# Patient Record
Sex: FEMALE | Race: WHITE | NOT HISPANIC OR LATINO | Employment: FULL TIME | URBAN - METROPOLITAN AREA
[De-identification: names, ages, dates, MRNs, and addresses within clinical notes are randomized per-mention and may not be internally consistent; named-entity substitution may affect disease eponyms.]

---

## 2018-04-16 ENCOUNTER — TELEPHONE (OUTPATIENT)
Dept: FAMILY MEDICINE | Facility: CLINIC | Age: 43
End: 2018-04-16

## 2018-04-16 RX ORDER — METHOCARBAMOL 500 MG/1
TABLET, FILM COATED ORAL
COMMUNITY
End: 2019-09-16 | Stop reason: SDUPTHER

## 2018-04-16 RX ORDER — PHENTERMINE HYDROCHLORIDE 37.5 MG/1
37.5 TABLET ORAL
Qty: 30 TABLET | Refills: 0 | Status: SHIPPED | OUTPATIENT
Start: 2018-04-16 | End: 2018-07-03 | Stop reason: SDUPTHER

## 2018-04-16 RX ORDER — PHENTERMINE HYDROCHLORIDE 37.5 MG/1
TABLET ORAL
COMMUNITY
End: 2018-04-16 | Stop reason: SDUPTHER

## 2018-04-16 NOTE — TELEPHONE ENCOUNTER
Contacted patient.  I can't prewscribe adipex right now, because I won't be here to f/u with you.  You can check with one of the other providers, but will probably have to make an appointment.

## 2018-05-18 ENCOUNTER — TELEPHONE (OUTPATIENT)
Dept: FAMILY MEDICINE | Facility: CLINIC | Age: 43
End: 2018-05-18

## 2018-05-18 NOTE — TELEPHONE ENCOUNTER
----- Message from RT Delon sent at 5/18/2018 10:11 AM CDT -----  Contact: [phone]  / Specimen No. [id number]  [phone]  / Specimen No. [id number], requesting arrived with illegible labels, please call to confirm if further testing in needed, thanks.

## 2018-05-18 NOTE — TELEPHONE ENCOUNTER
I  sent this message to Kristy Hernandez, as I was under the impression that it pertained to a lab specimen that she collected.  Roxanna

## 2018-06-02 ENCOUNTER — HOSPITAL ENCOUNTER (EMERGENCY)
Facility: HOSPITAL | Age: 43
Discharge: HOME | End: 2018-06-02
Attending: EMERGENCY MEDICINE
Payer: COMMERCIAL

## 2018-06-02 VITALS
HEIGHT: 62 IN | SYSTOLIC BLOOD PRESSURE: 124 MMHG | DIASTOLIC BLOOD PRESSURE: 91 MMHG | WEIGHT: 197 LBS | RESPIRATION RATE: 20 BRPM | OXYGEN SATURATION: 98 % | TEMPERATURE: 98 F | HEART RATE: 90 BPM | BODY MASS INDEX: 36.25 KG/M2

## 2018-06-02 DIAGNOSIS — T14.90XA TRAUMA: ICD-10-CM

## 2018-06-02 DIAGNOSIS — S90.31XA CONTUSION OF RIGHT FOOT, INITIAL ENCOUNTER: Primary | ICD-10-CM

## 2018-06-02 PROCEDURE — 25000003 PHARM REV CODE 250: Performed by: EMERGENCY MEDICINE

## 2018-06-02 PROCEDURE — 99283 EMERGENCY DEPT VISIT LOW MDM: CPT | Mod: 25

## 2018-06-02 PROCEDURE — 25000003 PHARM REV CODE 250

## 2018-06-02 PROCEDURE — 73630 X-RAY EXAM OF FOOT: CPT | Mod: TC,FY,RT

## 2018-06-02 PROCEDURE — 73630 X-RAY EXAM OF FOOT: CPT | Mod: 26,RT,, | Performed by: RADIOLOGY

## 2018-06-02 RX ORDER — HYDROCODONE BITARTRATE AND ACETAMINOPHEN 5; 325 MG/1; MG/1
1-2 TABLET ORAL EVERY 6 HOURS PRN
Qty: 12 TABLET | Refills: 0 | Status: SHIPPED | OUTPATIENT
Start: 2018-06-02 | End: 2018-11-05

## 2018-06-02 RX ORDER — HYDROCODONE BITARTRATE AND ACETAMINOPHEN 10; 325 MG/1; MG/1
1 TABLET ORAL
Status: COMPLETED | OUTPATIENT
Start: 2018-06-02 | End: 2018-06-02

## 2018-06-02 RX ORDER — IBUPROFEN 600 MG/1
600 TABLET ORAL
Status: COMPLETED | OUTPATIENT
Start: 2018-06-02 | End: 2018-06-02

## 2018-06-02 RX ORDER — IBUPROFEN 200 MG
TABLET ORAL
Status: COMPLETED
Start: 2018-06-02 | End: 2018-06-02

## 2018-06-02 RX ADMIN — HYDROCODONE BITARTRATE AND ACETAMINOPHEN 1 TABLET: 10; 325 TABLET ORAL at 10:06

## 2018-06-02 RX ADMIN — IBUPROFEN 600 MG: 200 TABLET, FILM COATED ORAL at 10:06

## 2018-06-02 RX ADMIN — IBUPROFEN 600 MG: 600 TABLET ORAL at 10:06

## 2018-06-03 NOTE — DISCHARGE INSTRUCTIONS
Buddy tape # 2-3  Post op shoe or similar so that the toes do not move  OTC NSAID - eg Motrin  Norco 1-2 every 6 hours   Dr Adkins DPM as needed

## 2018-06-03 NOTE — ED PROVIDER NOTES
Encounter Date: 6/2/2018       History     Chief Complaint   Patient presents with    Foot Pain     right foot pain onset today after dropping large item on foot      42 y F c/o right foot RD2-3 s/p blunt injury with plate glass edge at 0930 this am     Pain and swelling is reported   No prior injury or sugery in the area.           Review of patient's allergies indicates:  No Known Allergies  History reviewed. No pertinent past medical history.  Past Surgical History:   Procedure Laterality Date    HYSTERECTOMY       No family history on file.  Social History   Substance Use Topics    Smoking status: Never Smoker    Smokeless tobacco: Never Used    Alcohol use Yes      Comment: socially     Review of Systems   Musculoskeletal: Positive for arthralgias and joint swelling.   Skin: Negative.    Neurological: Negative for weakness and numbness.   All other systems reviewed and are negative.      Physical Exam     Initial Vitals [06/02/18 2205]   BP Pulse Resp Temp SpO2   (!) 124/91 90 20 98.2 °F (36.8 °C) 98 %      MAP       102         Physical Exam    Nursing note and vitals reviewed.  Constitutional: She appears well-developed and well-nourished.   Cardiovascular: Normal rate, regular rhythm, normal heart sounds and intact distal pulses.   Pulmonary/Chest: Breath sounds normal.   Musculoskeletal: She exhibits edema and tenderness.        Feet:          ED Course   Procedures  Labs Reviewed - No data to display       X-Rays:   Independently Interpreted Readings:   Other Readings:  Right foot    No dislocation or fracture of the involved RD2-3 digits     Medical Decision Making:   ED Management:  Contusion RD2-3 foot  Kevin tape  Post op shoe  Other care and meds as per AVS                       Clinical Impression:   The primary encounter diagnosis was Contusion of right foot, initial encounter. A diagnosis of Trauma was also pertinent to this visit.    X-Ray Foot Complete Right    (Results Pending)                               Jimbo Torres MD  06/03/18 0157

## 2018-07-03 RX ORDER — PHENTERMINE HYDROCHLORIDE 37.5 MG/1
37.5 TABLET ORAL
Qty: 30 TABLET | Refills: 0 | Status: SHIPPED | OUTPATIENT
Start: 2018-07-03 | End: 2018-09-14 | Stop reason: SDUPTHER

## 2018-07-03 RX ORDER — FAMCICLOVIR 500 MG/1
500 TABLET ORAL 3 TIMES DAILY
Qty: 21 TABLET | Refills: 2 | Status: SHIPPED | OUTPATIENT
Start: 2018-07-03 | End: 2018-08-09

## 2018-09-14 RX ORDER — PHENTERMINE HYDROCHLORIDE 37.5 MG/1
37.5 TABLET ORAL
Qty: 30 TABLET | Refills: 0 | Status: SHIPPED | OUTPATIENT
Start: 2018-09-14 | End: 2018-11-13 | Stop reason: SDUPTHER

## 2018-10-03 RX ORDER — FLUCONAZOLE 150 MG/1
150 TABLET ORAL DAILY
Qty: 1 TABLET | Refills: 1 | Status: SHIPPED | OUTPATIENT
Start: 2018-10-03 | End: 2018-10-04

## 2018-11-05 ENCOUNTER — OFFICE VISIT (OUTPATIENT)
Dept: FAMILY MEDICINE | Facility: CLINIC | Age: 43
End: 2018-11-05
Payer: COMMERCIAL

## 2018-11-05 VITALS
BODY MASS INDEX: 33.86 KG/M2 | HEART RATE: 74 BPM | RESPIRATION RATE: 18 BRPM | OXYGEN SATURATION: 100 % | HEIGHT: 62 IN | DIASTOLIC BLOOD PRESSURE: 91 MMHG | SYSTOLIC BLOOD PRESSURE: 139 MMHG | WEIGHT: 184 LBS

## 2018-11-05 DIAGNOSIS — Z12.39 BREAST CANCER SCREENING: ICD-10-CM

## 2018-11-05 DIAGNOSIS — Z00.00 ANNUAL PHYSICAL EXAM: Primary | ICD-10-CM

## 2018-11-05 PROCEDURE — 99999 PR PBB SHADOW E&M-EST. PATIENT-LVL III: CPT | Mod: PBBFAC,,, | Performed by: FAMILY MEDICINE

## 2018-11-05 PROCEDURE — 99396 PREV VISIT EST AGE 40-64: CPT | Mod: S$GLB,,, | Performed by: FAMILY MEDICINE

## 2018-11-05 RX ORDER — FAMCICLOVIR 500 MG/1
TABLET ORAL
COMMUNITY
Start: 2018-07-17 | End: 2018-11-13

## 2018-11-05 NOTE — PROGRESS NOTES
"Subjective:       Patient ID: Kristy Hernandez is a 43 y.o. female.    Chief Complaint: Annual Exam    Wellness visit    Patient reports they are feeling well without complaints today. Pt reports adherence to generally healthy diet, exercise plan, and good sleep schedule. Anticipatory guidance was provided. Wellness labs and procedures for age were discussed.        Review of Systems   Constitutional: Negative for activity change, appetite change, chills, fatigue and fever.   HENT: Negative for congestion, dental problem, facial swelling, nosebleeds, postnasal drip, sinus pain, sore throat, trouble swallowing and voice change.    Eyes: Negative for pain, discharge and visual disturbance.   Respiratory: Negative for apnea, cough, chest tightness and shortness of breath.    Cardiovascular: Negative for chest pain and palpitations.   Gastrointestinal: Negative for abdominal pain, blood in stool, constipation and nausea.   Endocrine: Negative for cold intolerance, polydipsia and polyuria.   Genitourinary: Negative for difficulty urinating, enuresis and flank pain.   Musculoskeletal: Negative for arthralgias and back pain.   Skin: Negative for color change.   Allergic/Immunologic: Negative for environmental allergies and immunocompromised state.   Neurological: Negative for dizziness and light-headedness.   Hematological: Negative for adenopathy.   Psychiatric/Behavioral: Negative for agitation, behavioral problems, decreased concentration and dysphoric mood. The patient is not nervous/anxious.    All other systems reviewed and are negative.        Reviewed family, medical, surgical, and social history.    Objective:      BP (!) 139/91 (BP Location: Left arm, Patient Position: Sitting, BP Method: Medium (Automatic))   Pulse 74   Resp 18   Ht 5' 2" (1.575 m)   Wt 83.5 kg (184 lb)   SpO2 100%   BMI 33.65 kg/m²   Physical Exam   Constitutional: She is oriented to person, place, and time. She appears well-developed " and well-nourished. No distress.   HENT:   Head: Normocephalic and atraumatic.   Nose: Nose normal.   Mouth/Throat: Oropharynx is clear and moist. No oropharyngeal exudate.   Eyes: Conjunctivae and EOM are normal. Pupils are equal, round, and reactive to light. No scleral icterus.   Neck: Normal range of motion. Neck supple. No thyromegaly present.   Cardiovascular: Normal rate, regular rhythm and normal heart sounds. Exam reveals no gallop and no friction rub.   No murmur heard.  Pulmonary/Chest: Effort normal and breath sounds normal. No respiratory distress. She has no wheezes. She has no rales. She exhibits no tenderness.   Abdominal: Soft. Bowel sounds are normal. She exhibits no distension. There is no tenderness. There is no guarding.   Musculoskeletal: Normal range of motion. She exhibits no edema, tenderness or deformity.   Lymphadenopathy:     She has no cervical adenopathy.   Neurological: She is alert and oriented to person, place, and time. She displays normal reflexes. No cranial nerve deficit or sensory deficit. She exhibits normal muscle tone.   Skin: Skin is warm and dry. No rash noted. She is not diaphoretic. No erythema. No pallor.   Psychiatric: She has a normal mood and affect. Her behavior is normal. Judgment and thought content normal.   Nursing note and vitals reviewed.      Assessment:       1. Annual physical exam    2. Breast cancer screening        Plan:       Annual physical exam  -     Glucose, fasting; Future; Expected date: 11/05/2018  -     Lipid panel; Future; Expected date: 11/05/2018    Breast cancer screening  -     Mammo Digital Screening Bilat with CAD; Future; Expected date: 11/05/2018            Risks, benefits, and side effects were discussed with the patient. All questions were answered to the fullest satisfaction of the patient, and pt verbalized understanding and agreement to treatment plan. Pt was to call with any new or worsening symptoms, or present to the ER.

## 2018-11-07 ENCOUNTER — LAB VISIT (OUTPATIENT)
Dept: LAB | Facility: HOSPITAL | Age: 43
End: 2018-11-07
Attending: FAMILY MEDICINE
Payer: COMMERCIAL

## 2018-11-07 DIAGNOSIS — Z00.00 ANNUAL PHYSICAL EXAM: ICD-10-CM

## 2018-11-07 LAB
CHOLEST SERPL-MCNC: 215 MG/DL
CHOLEST/HDLC SERPL: 5.2 {RATIO}
GLUCOSE SERPL-MCNC: 86 MG/DL
HDLC SERPL-MCNC: 41 MG/DL
HDLC SERPL: 19.1 %
LDLC SERPL CALC-MCNC: 132.8 MG/DL
NONHDLC SERPL-MCNC: 174 MG/DL
TRIGL SERPL-MCNC: 206 MG/DL

## 2018-11-07 PROCEDURE — 80061 LIPID PANEL: CPT

## 2018-11-07 PROCEDURE — 36415 COLL VENOUS BLD VENIPUNCTURE: CPT

## 2018-11-07 PROCEDURE — 82947 ASSAY GLUCOSE BLOOD QUANT: CPT

## 2018-11-12 ENCOUNTER — TELEPHONE (OUTPATIENT)
Dept: FAMILY MEDICINE | Facility: CLINIC | Age: 43
End: 2018-11-12
Payer: COMMERCIAL

## 2018-11-12 PROCEDURE — 90471 IMMUNIZATION ADMIN: CPT | Mod: S$GLB,,, | Performed by: FAMILY MEDICINE

## 2018-11-12 PROCEDURE — 90686 IIV4 VACC NO PRSV 0.5 ML IM: CPT | Mod: S$GLB,,, | Performed by: FAMILY MEDICINE

## 2018-11-13 RX ORDER — PHENTERMINE HYDROCHLORIDE 37.5 MG/1
37.5 TABLET ORAL
Qty: 30 TABLET | Refills: 0 | Status: SHIPPED | OUTPATIENT
Start: 2018-11-13 | End: 2019-01-16 | Stop reason: SDUPTHER

## 2018-11-13 RX ORDER — VALACYCLOVIR HYDROCHLORIDE 1 G/1
1000 TABLET, FILM COATED ORAL DAILY
Qty: 90 TABLET | Refills: 3 | Status: SHIPPED | OUTPATIENT
Start: 2018-11-13 | End: 2019-09-11 | Stop reason: SDUPTHER

## 2018-11-13 NOTE — TELEPHONE ENCOUNTER
----- Message from David Lopez MD sent at 11/5/2018  2:17 PM CST -----  Prescribe   Valtrex  phentermine

## 2018-11-30 ENCOUNTER — HOSPITAL ENCOUNTER (OUTPATIENT)
Dept: RADIOLOGY | Facility: HOSPITAL | Age: 43
Discharge: HOME | End: 2018-11-30
Attending: FAMILY MEDICINE
Payer: COMMERCIAL

## 2018-11-30 VITALS — BODY MASS INDEX: 33.13 KG/M2 | HEIGHT: 62 IN | WEIGHT: 180 LBS

## 2018-11-30 DIAGNOSIS — Z12.39 BREAST CANCER SCREENING: ICD-10-CM

## 2018-11-30 PROCEDURE — 77067 SCR MAMMO BI INCL CAD: CPT | Mod: 26,,, | Performed by: RADIOLOGY

## 2018-11-30 PROCEDURE — 77067 SCR MAMMO BI INCL CAD: CPT | Mod: TC

## 2018-12-06 ENCOUNTER — OFFICE VISIT (OUTPATIENT)
Dept: FAMILY MEDICINE | Facility: CLINIC | Age: 43
End: 2018-12-06
Payer: COMMERCIAL

## 2018-12-06 VITALS
OXYGEN SATURATION: 98 % | RESPIRATION RATE: 18 BRPM | BODY MASS INDEX: 33.86 KG/M2 | SYSTOLIC BLOOD PRESSURE: 125 MMHG | DIASTOLIC BLOOD PRESSURE: 83 MMHG | HEIGHT: 62 IN | HEART RATE: 87 BPM | WEIGHT: 184 LBS

## 2018-12-06 DIAGNOSIS — N62 LARGE BREASTS: Primary | ICD-10-CM

## 2018-12-06 PROCEDURE — 3008F BODY MASS INDEX DOCD: CPT | Mod: S$GLB,,, | Performed by: FAMILY MEDICINE

## 2018-12-06 PROCEDURE — 99213 OFFICE O/P EST LOW 20 MIN: CPT | Mod: S$GLB,,, | Performed by: FAMILY MEDICINE

## 2018-12-06 PROCEDURE — 99999 PR PBB SHADOW E&M-EST. PATIENT-LVL III: CPT | Mod: PBBFAC,,, | Performed by: FAMILY MEDICINE

## 2018-12-06 RX ORDER — FAMCICLOVIR 500 MG/1
500 TABLET ORAL 3 TIMES DAILY
COMMUNITY
End: 2019-09-14

## 2018-12-06 NOTE — PROGRESS NOTES
"Subjective:       Patient ID: Kristy Hernandez is a 43 y.o. female.    Chief Complaint: Follow-up    Follow up    Patient complains of large breasts  Poor posture  Lower back pain  Recurrent rashes under breast  Has tried weight loss with no reduction of breast size        Review of Systems   Constitutional: Negative for activity change, appetite change, chills, fatigue and fever.   HENT: Negative for congestion, dental problem, facial swelling, nosebleeds, postnasal drip, sinus pain, sore throat, trouble swallowing and voice change.    Eyes: Negative for pain, discharge and visual disturbance.   Respiratory: Negative for apnea, cough, chest tightness and shortness of breath.    Cardiovascular: Negative for chest pain and palpitations.   Gastrointestinal: Negative for abdominal pain, blood in stool, constipation and nausea.   Endocrine: Negative for cold intolerance, polydipsia and polyuria.   Genitourinary: Negative for difficulty urinating, enuresis and flank pain.   Musculoskeletal: Negative for arthralgias and back pain.   Skin: Negative for color change.   Allergic/Immunologic: Negative for environmental allergies and immunocompromised state.   Neurological: Negative for dizziness and light-headedness.   Hematological: Negative for adenopathy.   Psychiatric/Behavioral: Negative for agitation, behavioral problems, decreased concentration and dysphoric mood. The patient is not nervous/anxious.    All other systems reviewed and are negative.        Reviewed family, medical, surgical, and social history.    Objective:      /83 (BP Location: Left arm, Patient Position: Sitting, BP Method: Medium (Automatic))   Pulse 87   Resp 18   Ht 5' 2" (1.575 m)   Wt 83.5 kg (184 lb)   SpO2 98%   BMI 33.65 kg/m²   Physical Exam   Constitutional: She is oriented to person, place, and time. She appears well-developed and well-nourished. No distress.   HENT:   Head: Normocephalic and atraumatic.   Nose: Nose normal. "   Mouth/Throat: Oropharynx is clear and moist. No oropharyngeal exudate.   Eyes: Conjunctivae and EOM are normal. Pupils are equal, round, and reactive to light. No scleral icterus.   Neck: Normal range of motion. Neck supple. No thyromegaly present.   Cardiovascular: Normal rate, regular rhythm and normal heart sounds. Exam reveals no gallop and no friction rub.   No murmur heard.  Pulmonary/Chest: Effort normal and breath sounds normal. No respiratory distress. She has no wheezes. She has no rales. She exhibits no tenderness.   Abdominal: Soft. Bowel sounds are normal. She exhibits no distension. There is no tenderness. There is no guarding.   Musculoskeletal: Normal range of motion. She exhibits no edema, tenderness or deformity.   Lymphadenopathy:     She has no cervical adenopathy.   Neurological: She is alert and oriented to person, place, and time. She displays normal reflexes. No cranial nerve deficit or sensory deficit. She exhibits normal muscle tone.   Skin: Skin is warm and dry. No rash noted. She is not diaphoretic. No erythema. No pallor.   Psychiatric: She has a normal mood and affect. Her behavior is normal. Judgment and thought content normal.   Nursing note and vitals reviewed.      Assessment:       1. Large breasts        Plan:       Large breasts  -     Ambulatory referral to Plastic Surgery            Risks, benefits, and side effects were discussed with the patient. All questions were answered to the fullest satisfaction of the patient, and pt verbalized understanding and agreement to treatment plan. Pt was to call with any new or worsening symptoms, or present to the ER.

## 2019-01-08 ENCOUNTER — SURGICAL CONSULT (OUTPATIENT)
Dept: PLASTIC SURGERY | Facility: CLINIC | Age: 44
End: 2019-01-08
Payer: COMMERCIAL

## 2019-01-08 VITALS
DIASTOLIC BLOOD PRESSURE: 73 MMHG | SYSTOLIC BLOOD PRESSURE: 135 MMHG | WEIGHT: 188.69 LBS | HEIGHT: 62 IN | BODY MASS INDEX: 34.72 KG/M2 | HEART RATE: 103 BPM

## 2019-01-08 DIAGNOSIS — N62 BREAST HYPERTROPHY IN FEMALE: ICD-10-CM

## 2019-01-08 DIAGNOSIS — N62 BREAST HYPERTROPHY: Primary | ICD-10-CM

## 2019-01-08 PROCEDURE — 99204 PR OFFICE/OUTPT VISIT, NEW, LEVL IV, 45-59 MIN: ICD-10-PCS | Mod: S$GLB,,, | Performed by: PLASTIC SURGERY

## 2019-01-08 PROCEDURE — 3008F PR BODY MASS INDEX (BMI) DOCUMENTED: ICD-10-PCS | Mod: CPTII,S$GLB,, | Performed by: PLASTIC SURGERY

## 2019-01-08 PROCEDURE — 99204 OFFICE O/P NEW MOD 45 MIN: CPT | Mod: S$GLB,,, | Performed by: PLASTIC SURGERY

## 2019-01-08 PROCEDURE — 3008F BODY MASS INDEX DOCD: CPT | Mod: CPTII,S$GLB,, | Performed by: PLASTIC SURGERY

## 2019-01-08 NOTE — PROGRESS NOTES
REFERRAL FOR BREAST REDUCTION    Chief Complaint  Large breasts    Referring provider:  David Lopez MD    [facility]  Kristy Hernandez is a 44 yo female presenting with complaint of back, neck and shoulder pain related to the size of her breasts.  She currently wears a H cup bra and prefers to be a D cup or proportionate to her body habitus.  She has attempted to mitigate symptoms with weight loss, physical therapy, analgesics, and wearing extra bras without loss of weight in the breasts or symptomatic improvement.  She denies personal hx of breast biopsy.  There is a family history of breast cancer on her paternal side including a paternal aunt and cousin with breast cancer in their 40s.  Her most recent mammogram is benign.  Current BMI 33.65. Non-smoker.  Otherwise healthy.  She has had prior pregnancies and desires none further.    Imaging  Mammo 18:  Impression:  No mammographic evidence of malignancy.     BI-RADS Category 1: Negative     Recommendation:  Routine screening mammogram in 1 year is recommended.     The patient's estimated lifetime risk of breast cancer (to age 85) based on Tyrer-Cuzick - 7 risk assessment model is: Tyrer-Cuzick: 16.17 %. According to the [employer] Society,  patients with a lifetime breast cancer risk of 20% or higher might benefit from supplemental screening tests.    [facility]  There is no problem list on file for this patient.    PSH  Past Surgical History:   Procedure Laterality Date    2009    susie R wrist    ENDOMETRIAL ABLATION      HYSTERECTOMY      TONSILLECTOMY      TUBAL LIGATION         OBSTETRIC HISTORY  OB History      Para Term  AB Living    2 2 2          SAB TAB Ectopic Multiple Live Births                       FH  Family History   Problem Relation Age of Onset    Pancreatic cancer Father     Breast cancer Paternal Aunt     Breast cancer Paternal Grandmother     Breast cancer Paternal Cousin        MEDICATIONS  No outpatient  medications have been marked as taking for the 1/8/19 encounter (Appointment) with Ghanshyam Forte MD.       ALLERGIES Review of patient's allergies indicates:  No Known Allergies    SOCIAL HISTORY  Tobacco:   Social History     Tobacco Use   Smoking Status Never Smoker   Smokeless Tobacco Never Used     EtOH:   Social History     Substance and Sexual Activity   Alcohol Use Yes    Comment: socially       ROS  Review of Systems - General ROS: negative for - chills, fatigue, fever, hot flashes, malaise or night sweats  Psychological ROS: negative for - mood swings or sleep disturbances  Hematological and Lymphatic ROS: negative for - bleeding problems, blood clots, blood transfusions, bruising or fatigue  Endocrine ROS: negative for - hair pattern changes, hot flashes, malaise/lethargy, palpitations, polydipsia/polyuria or temperature intolerance  Breast ROS: positive for findings described above, negative for - nipple changes or nipple discharge  Respiratory ROS: no cough, shortness of breath, or wheezing  Cardiovascular ROS: no chest pain or dyspnea on exertion  Gastrointestinal ROS: no abdominal pain, change in bowel habits, or black or bloody stools  Genito-Urinary ROS: no dysuria, trouble voiding, or hematuria  Musculoskeletal ROS: negative for - gait disturbance, joint pain, joint stiffness, joint swelling or muscle pain  Neurological ROS: no TIA or stroke symptoms  Dermatological ROS: negative for acne, dry skin, eczema and hair changes    PHYSICAL EXAM  There were no vitals taken for this visit.    Constitutional: Pt is oriented to person, place, and time.  Pt appears well-developed and well-nourished.   HENT: Normocephalic and atraumatic.   Neck: Normal range of motion. Neck supple. No JVD present.   Cardiovascular: Normal rate, regular rhythm and normal heart sounds.    Pulmonary/Chest: Effort normal. No respiratory distress.   Abdomen: Soft. Non-tender. No masses or distension.  Musculoskeletal: Normal  range of motion. Pt exhibits no edema or deformity.   Neurological: Pt is alert and oriented to person, place, and time. No sensory deficit. Exhibits normal muscle tone.   Skin: Skin is warm. No rash noted. No erythema.   Psychiatric: Pt has a normal mood and affect. Behavior is normal    BREASTS  Asymmetry minimal  ptosis: grade 3  masses: right absent, left absent  estimated excess: right 800-900 gm, left 800-900 gm  shoulder grooving, erythema present   Inframammary fold @ 21 cm from the sternal notch  SN-N: right 35 cm, left 36 cm  IMF-N: right 17 cm, left 17 cm  Nipple sensibility: present  Lateral axillary, breast tail fullness: moderate  Tattoos on/around breast: absent  Lymphadenopathy: none  Scars none    ASSESSMENT  Encounter Diagnoses   Name Primary?    Breast hypertrophy Yes    Breast hypertrophy in female        I reviewed options for surgical management of enlarged breasts which may contribute to this patient's symptoms.  I explained the differences between breast reduction and mastopexy. I believe she will require reduction of at least 800-900 gm per breast and would plan bilateral superomedial pedical wise pattern reduction.  After reviewing outcomes, potential complications, necessary activity and medicinal restrictions, and expected recovery, she would like to proceed with surgery.    INFORMED CONSENT  The procedure was fully discussed with the patient. Outpatient or hospital ambulatory surgery disposition under general anesthesia were explained. She is a candidate for a vertical scar technique; the location of her scars was demonstrated. Her idealized bra size after surgery was solicited for surgical planning, although I explained her bra size after surgery could not be guaranteed.    Procedure: Bilateral breast reduction  Nonsurgical and surgical treatment options were reviewed.  Possible risks of breast reduction include but are not limited to:  -bleeding  -infection  -heavy and prolonged or  permanent scarring, possible keloid formation  -breast lumps, hardness  -breasts different size, shape  -loss of nipple sensation  -hypersensitivity of nipple-areolar complex  -wound separation or delayed wound healing  -venous thromboembolism  -complications from anesthesia  -difficulty with future mammograms  -emotional problems or negative impact on relationships from altered breast size  -desired breast size not attained: breasts too small or too large  -difficult bra fitting  -poor cosmetic outcome  -puckering of incisions  -irregular shape, nipple location  -need for further surgery  -inability to breast feed    Activity, possible work, and medicinal restrictions before and after breast reduction were reviewed. Estimated recovery and return to normal activities was estimated. Frequency of office visits after surgery and possible disability were also reviewed. Her questions about the above were solicited, she was encouraged to call back in the event of any further questions, and she was encouraged to share her plans for breast reduction and any concerns with her significant others.    The importance for a normal mammogram within one year of the surgery date for women over 40 years old, with a family history of breast cancer, or a prior abnormal breast exam was explained. She understands her weight should be optimized prior to surgery.      PLAN  A surgery scheduling form was completed for bilateral reduction mammaplasty.  Time: 3 h  Assistants: staff, tech assist.  Disposition: outpatient or hospital ambulatory surgery  General anesthesia.  Labs: CBC, Electrolytes, pregnancy test, EKG  Antibiotic prophylaxis: Cefazolin 2GM preop  Antiembolic prophylaxis with sequential pneumatic devices.    This encounter length was 45 minutes for  Encounter Diagnoses   Name Primary?    Breast hypertrophy Yes    Breast hypertrophy in female        Over 50% of the encounter length was spent in face to face counseling about the  relevant issues pertaining to the diagnoses, management choices, and prognosis.      Electronically signed by:  Ghanshyam Forte  1/11/2019  11:18 AM

## 2019-01-11 PROBLEM — N62 BREAST HYPERTROPHY IN FEMALE: Status: ACTIVE | Noted: 2019-01-11

## 2019-01-11 RX ORDER — LIDOCAINE HYDROCHLORIDE 10 MG/ML
1 INJECTION, SOLUTION EPIDURAL; INFILTRATION; INTRACAUDAL; PERINEURAL ONCE
Status: CANCELLED | OUTPATIENT
Start: 2019-01-11 | End: 2019-01-11

## 2019-01-16 RX ORDER — PHENTERMINE HYDROCHLORIDE 37.5 MG/1
37.5 TABLET ORAL
Qty: 30 TABLET | Refills: 0 | Status: SHIPPED | OUTPATIENT
Start: 2019-01-16 | End: 2019-09-11 | Stop reason: SDUPTHER

## 2019-03-01 DIAGNOSIS — N62 BREAST HYPERTROPHY IN FEMALE: Primary | ICD-10-CM

## 2019-03-01 DIAGNOSIS — N62 BREAST HYPERTROPHY: Primary | ICD-10-CM

## 2019-03-08 ENCOUNTER — ANESTHESIA EVENT (OUTPATIENT)
Dept: SURGERY | Facility: OTHER | Age: 44
End: 2019-03-08
Payer: COMMERCIAL

## 2019-03-08 ENCOUNTER — HOSPITAL ENCOUNTER (OUTPATIENT)
Dept: PREADMISSION TESTING | Facility: OTHER | Age: 44
Discharge: HOME | End: 2019-03-08
Attending: PLASTIC SURGERY
Payer: COMMERCIAL

## 2019-03-08 ENCOUNTER — OFFICE VISIT (OUTPATIENT)
Dept: PLASTIC SURGERY | Facility: CLINIC | Age: 44
End: 2019-03-08
Payer: COMMERCIAL

## 2019-03-08 VITALS — WEIGHT: 188.69 LBS | HEIGHT: 62 IN | BODY MASS INDEX: 34.72 KG/M2

## 2019-03-08 VITALS
HEART RATE: 104 BPM | TEMPERATURE: 98 F | OXYGEN SATURATION: 96 % | BODY MASS INDEX: 34.6 KG/M2 | DIASTOLIC BLOOD PRESSURE: 90 MMHG | SYSTOLIC BLOOD PRESSURE: 133 MMHG | HEIGHT: 62 IN | WEIGHT: 188 LBS

## 2019-03-08 DIAGNOSIS — N62 BREAST HYPERTROPHY: Primary | ICD-10-CM

## 2019-03-08 DIAGNOSIS — N62 BREAST HYPERTROPHY IN FEMALE: ICD-10-CM

## 2019-03-08 DIAGNOSIS — N62 BREAST HYPERTROPHY: ICD-10-CM

## 2019-03-08 LAB
ANION GAP SERPL CALC-SCNC: 8 MMOL/L
BASOPHILS # BLD AUTO: 0.06 K/UL
BASOPHILS NFR BLD: 0.7 %
BUN SERPL-MCNC: 15 MG/DL
CALCIUM SERPL-MCNC: 9.3 MG/DL
CHLORIDE SERPL-SCNC: 107 MMOL/L
CO2 SERPL-SCNC: 23 MMOL/L
CREAT SERPL-MCNC: 1 MG/DL
DIFFERENTIAL METHOD: ABNORMAL
EOSINOPHIL # BLD AUTO: 0.6 K/UL
EOSINOPHIL NFR BLD: 6.8 %
ERYTHROCYTE [DISTWIDTH] IN BLOOD BY AUTOMATED COUNT: 13.6 %
EST. GFR  (AFRICAN AMERICAN): >60 ML/MIN/1.73 M^2
EST. GFR  (NON AFRICAN AMERICAN): >60 ML/MIN/1.73 M^2
GLUCOSE SERPL-MCNC: 88 MG/DL
HCT VFR BLD AUTO: 38.8 %
HGB BLD-MCNC: 12.9 G/DL
LYMPHOCYTES # BLD AUTO: 2.6 K/UL
LYMPHOCYTES NFR BLD: 28.7 %
MCH RBC QN AUTO: 28.1 PG
MCHC RBC AUTO-ENTMCNC: 33.2 G/DL
MCV RBC AUTO: 85 FL
MONOCYTES # BLD AUTO: 0.5 K/UL
MONOCYTES NFR BLD: 5.9 %
NEUTROPHILS # BLD AUTO: 5.2 K/UL
NEUTROPHILS NFR BLD: 57.8 %
PLATELET # BLD AUTO: 278 K/UL
PMV BLD AUTO: 9.6 FL
POTASSIUM SERPL-SCNC: 4.6 MMOL/L
RBC # BLD AUTO: 4.59 M/UL
SODIUM SERPL-SCNC: 138 MMOL/L
WBC # BLD AUTO: 8.96 K/UL

## 2019-03-08 PROCEDURE — 99499 UNLISTED E&M SERVICE: CPT | Mod: S$GLB,,, | Performed by: PLASTIC SURGERY

## 2019-03-08 PROCEDURE — 93010 EKG 12-LEAD: ICD-10-PCS | Mod: ,,, | Performed by: INTERNAL MEDICINE

## 2019-03-08 PROCEDURE — 99499 NO LOS: ICD-10-PCS | Mod: S$GLB,,, | Performed by: PLASTIC SURGERY

## 2019-03-08 PROCEDURE — 93005 ELECTROCARDIOGRAM TRACING: CPT

## 2019-03-08 PROCEDURE — 80048 BASIC METABOLIC PNL TOTAL CA: CPT

## 2019-03-08 PROCEDURE — 36415 COLL VENOUS BLD VENIPUNCTURE: CPT

## 2019-03-08 PROCEDURE — 85025 COMPLETE CBC W/AUTO DIFF WBC: CPT

## 2019-03-08 PROCEDURE — 93010 ELECTROCARDIOGRAM REPORT: CPT | Mod: ,,, | Performed by: INTERNAL MEDICINE

## 2019-03-08 RX ORDER — SODIUM CHLORIDE, SODIUM LACTATE, POTASSIUM CHLORIDE, CALCIUM CHLORIDE 600; 310; 30; 20 MG/100ML; MG/100ML; MG/100ML; MG/100ML
INJECTION, SOLUTION INTRAVENOUS CONTINUOUS
Status: CANCELLED | OUTPATIENT
Start: 2019-03-08

## 2019-03-08 RX ORDER — FAMOTIDINE 20 MG/1
20 TABLET, FILM COATED ORAL
Status: CANCELLED | OUTPATIENT
Start: 2019-03-08 | End: 2019-03-08

## 2019-03-08 RX ORDER — LIDOCAINE HYDROCHLORIDE 10 MG/ML
0.5 INJECTION, SOLUTION EPIDURAL; INFILTRATION; INTRACAUDAL; PERINEURAL ONCE
Status: CANCELLED | OUTPATIENT
Start: 2019-03-08 | End: 2019-03-08

## 2019-03-08 RX ORDER — OXYCODONE HYDROCHLORIDE 5 MG/1
5 TABLET ORAL ONCE AS NEEDED
Status: CANCELLED | OUTPATIENT
Start: 2019-03-08 | End: 2030-08-04

## 2019-03-08 NOTE — H&P (VIEW-ONLY)
PREOPERATIVE HISTORY AND PHYSICAL    Chief Complaint:  Breast Hypertrophy    PCP: David Lopez MD    HPI  History from chart, patient  From my original consult :  Kristy Hernandez is a 44 yo female presenting with complaint of back, neck and shoulder pain related to the size of her breasts.  She currently wears a H cup bra and prefers to be a D cup or proportionate to her body habitus.  She has attempted to mitigate symptoms with weight loss, physical therapy, analgesics, and wearing extra bras without loss of weight in the breasts or symptomatic improvement.  She denies personal hx of breast biopsy.  There is a family history of breast cancer on her paternal side including a paternal aunt and cousin with breast cancer in their 40s.  Her most recent mammogram is benign.  Current BMI 33.65. Non-smoker.  Otherwise healthy.  She has had prior pregnancies and desires none further.    Today:  She returns to signs consents, review risks, benefits, potential complications and expected outcomes.  All questions answered.  Photos and labs to be completed today.    [facility]  Patient Active Problem List    Diagnosis Date Noted    Breast hypertrophy in female 2019     There are no hospital problems to display for this patient.      [facility]  Past Surgical History:   Procedure Laterality Date    2009    dequervein R wrist    ENDOMETRIAL ABLATION      HYSTERECTOMY      TONSILLECTOMY      TUBAL LIGATION         FHx  Family History   Problem Relation Age of Onset    Pancreatic cancer Father     Breast cancer Paternal Aunt     Breast cancer Paternal Grandmother     Breast cancer Paternal Cousin        OB-Hx  OB History      Para Term  AB Living    2 2 2          SAB TAB Ectopic Multiple Live Births                       MEDICATIONS  Current Outpatient Medications   Medication Sig Dispense Refill    famciclovir (FAMVIR) 500 MG tablet Take 500 mg by mouth 3 (three) times daily.      methocarbamol  "(ROBAXIN) 500 MG Tab Robaxin 500 mg tablet   Take 1 tablet as needed by oral route.      phentermine (ADIPEX-P) 37.5 mg tablet Take 1 tablet (37.5 mg total) by mouth before breakfast. 30 tablet 0    valACYclovir (VALTREX) 1000 MG tablet Take 1 tablet (1,000 mg total) by mouth once daily. 90 tablet 3    vortioxetine (TRINTELLIX) 20 mg Tab Take 1 tablet (20 mg total) by mouth once daily. 90 tablet 3     No current facility-administered medications for this visit.        ALLERGIES Review of patient's allergies indicates:  No Known Allergies    SOCIAL HISTORY  Tobacco:   Social History     Tobacco Use   Smoking Status Never Smoker   Smokeless Tobacco Never Used     EtOH:   Social History     Substance and Sexual Activity   Alcohol Use Yes    Comment: socially     Drugs:   Social History     Substance and Sexual Activity   Drug Use Not on file       ALLERGIES  Review of patient's allergies indicates:  No Known Allergies    REVIEW OF SYSTEMS:  CONSTITUTIONAL: negative for fatigue, chills, fever, weight gain, weight loss  INTEGUMENTARY: negative for rash, pruritis, skin lesions  HENT: negative for congestion, hoarseness, hearing loss, and sore throat  PULMONARY: negative cough, productive sputum, wheezing, shortness of breath  CARDIAC: negative chest pain, palpitations, dyspnea on exertion, orthopnea  GENITOURINARY: negative for dysuria, bloody urine, discharge  ENDOCRINE: negative excessive thirst, frequent urination, unexplained weight loss  HEMATOPOIEOTIC:negative bruising, enlarged lymph nodes, prolonged bleeding  NEUROLOGIC: negative dizziness, loss of consciousness, numbness  PSYCHIATRIC: negative for depression, hearing voices, anxiety    PHYSICAL EXAMINATION  GENERAL APPEARANCE  Ht 5' 2" (1.575 m)   Wt 85.6 kg (188 lb 11.4 oz)   BMI 34.52 kg/m²   Well developed, well nourished in no acute distress.    INTEGUMENT  Skin is warm and dry. No erythema, rash.    PSYCHIATRIC  Affect normal.    HEENT  Normocephalic, " atraumatic.  Extraocular motions normal. PERRL  Neck: Neck supple.    BREAST  BREASTS  Asymmetry minimal  ptosis: grade 3  masses: right absent, left absent  estimated excess: right 800-900 gm, left 800-900 gm  shoulder grooving, erythema present   Inframammary fold @ 21 cm from the sternal notch  SN-N: right 35 cm, left 36 cm  IMF-N: right 17 cm, left 17 cm  Nipple sensibility: present  Lateral axillary, breast tail fullness: moderate  Tattoos on/around breast: absent  Lymphadenopathy: none  Scars none    LUNGS  Clear, no wheezing, crackles, rhonchi    CARDIOVASCULAR  No JVD    ABDOMEN  Soft, nontender  Organomegaly absent  Hernias absent    EXTREMITIES  Peripheral edema absent  Pedal, wrist pulses present    LABS  No results found for: WBC, HGB, HCT, MCV, PLT  No results found for: NA, K, CL, CO2  No results found for: BUN  No results found for: CREATININE  No results found for: ALBUMIN  No results found for: LABA1C, HGBA1C    IMAGING  Reviewed Mammogram 11/2018    ASSESSMENT  Encounter Diagnoses   Name Primary?    Breast hypertrophy Yes     INFORMED CONSENT    The procedure was fully discussed with the patient. Outpatient or hospital ambulatory surgery disposition under general anesthesia were explained.  The location of her scars was demonstrated. Her idealized bra size after surgery was solicited for surgical planning, although I explained her bra size after surgery could not be guaranteed.    Nonsurgical and surgical treatment options were reviewed.  Possible risks of breast reduction include but are not limited to:  -bleeding  -infection  -heavy and prolonged or permanent scarring, possible keloid formation  -nipple necrosis, complete or partial  -nipple inversion or flattening  -breast lumps, hardness  -breasts different size, shape  -loss of nipple sensation  -hypersensitivity of nipple-areolar complex  -wound separation or delayed wound healing  -venous thromboembolism  -complications from  anesthesia  -difficulty with future mammograms  -emotional problems or negative impact on relationships from altered breast size  -desired breast size not attained: breasts too small or too large  -difficult bra fitting  -poor cosmetic outcome  -puckering of incisions  -irregular shape, nipple location  -need for further surgery  -inability to breast feed  -problems with anesthesia  -blood clot, pulmonary embolus  -heart attack, stroke, death    Activity, possible work, and medicinal restrictions before and after breast reduction were reviewed. Estimated recovery and return to normal activities was estimated. Frequency of office visits after surgery and possible disability were also reviewed. Her questions about the above were solicited, she was encouraged to call back in the event of any further questions, and she was encouraged to share her plans for breast reduction and any concerns with her significant others.    The importance for a normal mammogram within one year of the surgery date for women over 40 years old, with a family history of breast cancer, or a prior abnormal breast exam was explained. She understands her weight should be optimized prior to surgery.    PLAN  A surgery scheduling form was completed for bilateral reduction mammaplasty.  Time: 2 1/2 HRS  Assistants: staff, tech assist.  Disposition: outpatient or hospital ambulatory surgery  General anesthesia.  Labs: CBC, HbA1C, EKG, pregnancy test, electrolytes.  Antibiotic prophylaxis: Cefazolin 2 GM preop  Antiembolic prophylaxis with sequential pneumatic devices.    Electronically signed by:  Ghanshyam Forte  3/8/2019  12:20 PM

## 2019-03-08 NOTE — ANESTHESIA PREPROCEDURE EVALUATION
03/08/2019  Kristy Hernandez is a 43 y.o., female.    Anesthesia Evaluation    I have reviewed the Patient Summary Reports.    I have reviewed the Nursing Notes.   I have reviewed the Medications.     Review of Systems  Anesthesia Hx:  No problems with previous Anesthesia    Social:  Former Smoker, Social Alcohol Use    Hematology/Oncology:  Hematology Normal   Oncology Normal     EENT/Dental:EENT/Dental Normal   Cardiovascular:  Cardiovascular Normal Exercise tolerance: good     Pulmonary:  Pulmonary Normal    Renal/:  Renal/ Normal     Hepatic/GI:  Hepatic/GI Normal    Musculoskeletal:  Musculoskeletal Normal    Neurological:  Neurology Normal    Endocrine:  Endocrine Normal    Psych:   anxiety depression          Physical Exam  General:  Obesity    Airway/Jaw/Neck:  Airway Findings: Mouth Opening: Normal Tongue: Normal  General Airway Assessment: Adult, Good  Mallampati: I  TM Distance: Normal, at least 6 cm  Jaw/Neck Findings:  Neck ROM: Normal ROM      Dental:  Dental Findings: In tact, molar caps        Mental Status:  Mental Status Findings:  Cooperative, Alert and Oriented         Anesthesia Plan  Type of Anesthesia, risks & benefits discussed:  Anesthesia Type:  general  Patient's Preference:   Intra-op Monitoring Plan: standard ASA monitors  Intra-op Monitoring Plan Comments:   Post Op Pain Control Plan:   Post Op Pain Control Plan Comments:   Induction:    Beta Blocker:         Informed Consent: Patient understands risks and agrees with Anesthesia plan.  Questions answered. Anesthesia consent signed with patient.  ASA Score: 2     Day of Surgery Review of History & Physical:    H&P update referred to the surgeon.     Anesthesia Plan Notes: Labs pending.        Ready For Surgery From Anesthesia Perspective.

## 2019-03-08 NOTE — DISCHARGE INSTRUCTIONS
PRE-ADMIT TESTING -  [phone]    [address]  [place] [facility]          Your surgery has been scheduled at [facility]. We are pleased to have the opportunity to serve you. For Further Information please call [phone].    On the day of surgery please report to the Information Desk on the 1st floor.    · CONTACT YOUR PHYSICIAN'S OFFICE THE DAY PRIOR TO YOUR SURGERY TO OBTAIN YOUR ARRIVAL TIME.     · The evening before surgery do not eat anything after 9 p.m. ( this includes hard candy, chewing gum and mints).  You may only have GATORADE, POWERADE AND WATER  from 9 p.m. until you leave your home.   DO NOT DRINK ANY LIQUIDS ON THE WAY TO THE HOSPITAL.      SPECIAL MEDICATION INSTRUCTIONS: TAKE medications checked off by the Anesthesiologist on your Medication List.    Angiogram Patients: Take medications as instructed by your physician, including aspirin.     Surgery Patients:    If you take ASPIRIN - Your PHYSICIAN/SURGEON will need to inform you IF/OR when you need to stop taking aspirin prior to your surgery.     Do Not take any medications containing IBUPROFEN.  Do Not Wear any make-up or dark nail polish   (especially eye make-up) to surgery. If you come to surgery with makeup on you will be required to remove the makeup or nail polish.    Do not shave your surgical area at least 5 days prior to your surgery. The surgical prep will be performed at the hospital according to Infection Control regulations.    Leave all valuables at home.   Do Not wear any jewelry or watches, including any metal in body piercings. Jewelry must be removed prior to coming to the hospital.  There is a possibility that rings that are unable to be removed may be cut off if they are on the surgical extremity.    Contact Lens must be removed before surgery. Either do not wear the contact lens or bring a case and solution for storage.  Please bring a container for eyeglasses or dentures as required.  Bring  any paperwork your physician has provided, such as consent forms,  history and physicals, doctor's orders, etc.   Bring comfortable clothes that are loose fitting to wear upon discharge. Take into consideration the type of surgery being performed.  Maintain your diet as advised per your physician the day prior to surgery.      Adequate rest the night before surgery is advised.   Park in the Parking lot behind the hospital or in the [facility] across the street from the parking lot. Parking is complimentary.  If you will be discharged the same day as your procedure, please arrange for a responsible adult to drive you home or to accompany you if traveling by taxi.   YOU WILL NOT BE PERMITTED TO DRIVE OR TO LEAVE THE HOSPITAL ALONE AFTER SURGERY.   It is strongly recommended that you arrange for someone to remain with you for the first 24 hrs following your surgery.       Thank you for your cooperation.  The Staff of [facility].        Bathing Instructions                                                                 Please shower the evening before and morning of your procedure with    ANTIBACTERIAL SOAP. ( DIAL, etc )  Concentrate on the surgical area   for at least 3 minutes and rinse completely. Dry off as usual.   Do not use any deodorant, powder, body lotions, perfume, after shave or    cologne.

## 2019-03-08 NOTE — H&P
PREOPERATIVE HISTORY AND PHYSICAL    Chief Complaint:  Breast Hypertrophy    PCP: David Lopez MD    HPI  History from chart, patient  From my original consult :  Kristy Hernandez is a 44 yo female presenting with complaint of back, neck and shoulder pain related to the size of her breasts.  She currently wears a H cup bra and prefers to be a D cup or proportionate to her body habitus.  She has attempted to mitigate symptoms with weight loss, physical therapy, analgesics, and wearing extra bras without loss of weight in the breasts or symptomatic improvement.  She denies personal hx of breast biopsy.  There is a family history of breast cancer on her paternal side including a paternal aunt and cousin with breast cancer in their 40s.  Her most recent mammogram is benign.  Current BMI 33.65. Non-smoker.  Otherwise healthy.  She has had prior pregnancies and desires none further.    Today:  She returns to signs consents, review risks, benefits, potential complications and expected outcomes.  All questions answered.  Photos and labs to be completed today.    [facility]  Patient Active Problem List    Diagnosis Date Noted    Breast hypertrophy in female 2019     There are no hospital problems to display for this patient.      [facility]  Past Surgical History:   Procedure Laterality Date    2009    dequervein R wrist    ENDOMETRIAL ABLATION      HYSTERECTOMY      TONSILLECTOMY      TUBAL LIGATION         FHx  Family History   Problem Relation Age of Onset    Pancreatic cancer Father     Breast cancer Paternal Aunt     Breast cancer Paternal Grandmother     Breast cancer Paternal Cousin        OB-Hx  OB History      Para Term  AB Living    2 2 2          SAB TAB Ectopic Multiple Live Births                       MEDICATIONS  Current Outpatient Medications   Medication Sig Dispense Refill    famciclovir (FAMVIR) 500 MG tablet Take 500 mg by mouth 3 (three) times daily.      methocarbamol  "(ROBAXIN) 500 MG Tab Robaxin 500 mg tablet   Take 1 tablet as needed by oral route.      phentermine (ADIPEX-P) 37.5 mg tablet Take 1 tablet (37.5 mg total) by mouth before breakfast. 30 tablet 0    valACYclovir (VALTREX) 1000 MG tablet Take 1 tablet (1,000 mg total) by mouth once daily. 90 tablet 3    vortioxetine (TRINTELLIX) 20 mg Tab Take 1 tablet (20 mg total) by mouth once daily. 90 tablet 3     No current facility-administered medications for this visit.        ALLERGIES Review of patient's allergies indicates:  No Known Allergies    SOCIAL HISTORY  Tobacco:   Social History     Tobacco Use   Smoking Status Never Smoker   Smokeless Tobacco Never Used     EtOH:   Social History     Substance and Sexual Activity   Alcohol Use Yes    Comment: socially     Drugs:   Social History     Substance and Sexual Activity   Drug Use Not on file       ALLERGIES  Review of patient's allergies indicates:  No Known Allergies    REVIEW OF SYSTEMS:  CONSTITUTIONAL: negative for fatigue, chills, fever, weight gain, weight loss  INTEGUMENTARY: negative for rash, pruritis, skin lesions  HENT: negative for congestion, hoarseness, hearing loss, and sore throat  PULMONARY: negative cough, productive sputum, wheezing, shortness of breath  CARDIAC: negative chest pain, palpitations, dyspnea on exertion, orthopnea  GENITOURINARY: negative for dysuria, bloody urine, discharge  ENDOCRINE: negative excessive thirst, frequent urination, unexplained weight loss  HEMATOPOIEOTIC:negative bruising, enlarged lymph nodes, prolonged bleeding  NEUROLOGIC: negative dizziness, loss of consciousness, numbness  PSYCHIATRIC: negative for depression, hearing voices, anxiety    PHYSICAL EXAMINATION  GENERAL APPEARANCE  Ht 5' 2" (1.575 m)   Wt 85.6 kg (188 lb 11.4 oz)   BMI 34.52 kg/m²   Well developed, well nourished in no acute distress.    INTEGUMENT  Skin is warm and dry. No erythema, rash.    PSYCHIATRIC  Affect normal.    HEENT  Normocephalic, " atraumatic.  Extraocular motions normal. PERRL  Neck: Neck supple.    BREAST  BREASTS  Asymmetry minimal  ptosis: grade 3  masses: right absent, left absent  estimated excess: right 800-900 gm, left 800-900 gm  shoulder grooving, erythema present   Inframammary fold @ 21 cm from the sternal notch  SN-N: right 35 cm, left 36 cm  IMF-N: right 17 cm, left 17 cm  Nipple sensibility: present  Lateral axillary, breast tail fullness: moderate  Tattoos on/around breast: absent  Lymphadenopathy: none  Scars none    LUNGS  Clear, no wheezing, crackles, rhonchi    CARDIOVASCULAR  No JVD    ABDOMEN  Soft, nontender  Organomegaly absent  Hernias absent    EXTREMITIES  Peripheral edema absent  Pedal, wrist pulses present    LABS  No results found for: WBC, HGB, HCT, MCV, PLT  No results found for: NA, K, CL, CO2  No results found for: BUN  No results found for: CREATININE  No results found for: ALBUMIN  No results found for: LABA1C, HGBA1C    IMAGING  Reviewed Mammogram 11/2018    ASSESSMENT  Encounter Diagnoses   Name Primary?    Breast hypertrophy Yes     INFORMED CONSENT    The procedure was fully discussed with the patient. Outpatient or hospital ambulatory surgery disposition under general anesthesia were explained.  The location of her scars was demonstrated. Her idealized bra size after surgery was solicited for surgical planning, although I explained her bra size after surgery could not be guaranteed.    Nonsurgical and surgical treatment options were reviewed.  Possible risks of breast reduction include but are not limited to:  -bleeding  -infection  -heavy and prolonged or permanent scarring, possible keloid formation  -nipple necrosis, complete or partial  -nipple inversion or flattening  -breast lumps, hardness  -breasts different size, shape  -loss of nipple sensation  -hypersensitivity of nipple-areolar complex  -wound separation or delayed wound healing  -venous thromboembolism  -complications from  anesthesia  -difficulty with future mammograms  -emotional problems or negative impact on relationships from altered breast size  -desired breast size not attained: breasts too small or too large  -difficult bra fitting  -poor cosmetic outcome  -puckering of incisions  -irregular shape, nipple location  -need for further surgery  -inability to breast feed  -problems with anesthesia  -blood clot, pulmonary embolus  -heart attack, stroke, death    Activity, possible work, and medicinal restrictions before and after breast reduction were reviewed. Estimated recovery and return to normal activities was estimated. Frequency of office visits after surgery and possible disability were also reviewed. Her questions about the above were solicited, she was encouraged to call back in the event of any further questions, and she was encouraged to share her plans for breast reduction and any concerns with her significant others.    The importance for a normal mammogram within one year of the surgery date for women over 40 years old, with a family history of breast cancer, or a prior abnormal breast exam was explained. She understands her weight should be optimized prior to surgery.    PLAN  A surgery scheduling form was completed for bilateral reduction mammaplasty.  Time: 2 1/2 HRS  Assistants: staff, tech assist.  Disposition: outpatient or hospital ambulatory surgery  General anesthesia.  Labs: CBC, HbA1C, EKG, pregnancy test, electrolytes.  Antibiotic prophylaxis: Cefazolin 2 GM preop  Antiembolic prophylaxis with sequential pneumatic devices.    Electronically signed by:  Ghanshyam Forte  3/8/2019  12:20 PM

## 2019-03-13 ENCOUNTER — ANESTHESIA (OUTPATIENT)
Dept: SURGERY | Facility: OTHER | Age: 44
End: 2019-03-13
Payer: COMMERCIAL

## 2019-03-13 ENCOUNTER — HOSPITAL ENCOUNTER (OUTPATIENT)
Facility: OTHER | Age: 44
Discharge: HOME | End: 2019-03-13
Attending: PLASTIC SURGERY | Admitting: PLASTIC SURGERY
Payer: COMMERCIAL

## 2019-03-13 VITALS
TEMPERATURE: 98 F | SYSTOLIC BLOOD PRESSURE: 122 MMHG | HEIGHT: 62 IN | HEART RATE: 98 BPM | WEIGHT: 188 LBS | RESPIRATION RATE: 16 BRPM | OXYGEN SATURATION: 95 % | DIASTOLIC BLOOD PRESSURE: 71 MMHG | BODY MASS INDEX: 34.6 KG/M2

## 2019-03-13 DIAGNOSIS — N62 BREAST HYPERTROPHY IN FEMALE: Primary | ICD-10-CM

## 2019-03-13 PROCEDURE — 25000003 PHARM REV CODE 250: Performed by: NURSE ANESTHETIST, CERTIFIED REGISTERED

## 2019-03-13 PROCEDURE — 19318 PR REDUCTION OF LARGE BREAST: ICD-10-PCS | Mod: 50,,, | Performed by: PLASTIC SURGERY

## 2019-03-13 PROCEDURE — P9045 ALBUMIN (HUMAN), 5%, 250 ML: HCPCS | Mod: JG | Performed by: NURSE ANESTHETIST, CERTIFIED REGISTERED

## 2019-03-13 PROCEDURE — 25000003 PHARM REV CODE 250: Performed by: ANESTHESIOLOGY

## 2019-03-13 PROCEDURE — 88305 TISSUE SPECIMEN TO PATHOLOGY - SURGERY: ICD-10-PCS | Mod: 26,,, | Performed by: PATHOLOGY

## 2019-03-13 PROCEDURE — 37000008 HC ANESTHESIA 1ST 15 MINUTES: Performed by: PLASTIC SURGERY

## 2019-03-13 PROCEDURE — 63600175 PHARM REV CODE 636 W HCPCS: Performed by: ANESTHESIOLOGY

## 2019-03-13 PROCEDURE — 25000003 PHARM REV CODE 250: Performed by: SPECIALIST

## 2019-03-13 PROCEDURE — 71000016 HC POSTOP RECOV ADDL HR: Performed by: PLASTIC SURGERY

## 2019-03-13 PROCEDURE — 71000015 HC POSTOP RECOV 1ST HR: Performed by: PLASTIC SURGERY

## 2019-03-13 PROCEDURE — 36000706: Performed by: PLASTIC SURGERY

## 2019-03-13 PROCEDURE — 36000707: Performed by: PLASTIC SURGERY

## 2019-03-13 PROCEDURE — 63600175 PHARM REV CODE 636 W HCPCS: Performed by: NURSE ANESTHETIST, CERTIFIED REGISTERED

## 2019-03-13 PROCEDURE — 19318 BREAST REDUCTION: CPT | Mod: 50,,, | Performed by: PLASTIC SURGERY

## 2019-03-13 PROCEDURE — S0020 INJECTION, BUPIVICAINE HYDRO: HCPCS | Performed by: PLASTIC SURGERY

## 2019-03-13 PROCEDURE — 88305 TISSUE EXAM BY PATHOLOGIST: CPT | Mod: 26,,, | Performed by: PATHOLOGY

## 2019-03-13 PROCEDURE — 63600175 PHARM REV CODE 636 W HCPCS: Performed by: PLASTIC SURGERY

## 2019-03-13 PROCEDURE — 88305 TISSUE EXAM BY PATHOLOGIST: CPT | Performed by: PATHOLOGY

## 2019-03-13 PROCEDURE — 71000033 HC RECOVERY, INTIAL HOUR: Performed by: PLASTIC SURGERY

## 2019-03-13 PROCEDURE — 37000009 HC ANESTHESIA EA ADD 15 MINS: Performed by: PLASTIC SURGERY

## 2019-03-13 PROCEDURE — 25000003 PHARM REV CODE 250: Performed by: PLASTIC SURGERY

## 2019-03-13 RX ORDER — LIDOCAINE HCL/PF 100 MG/5ML
SYRINGE (ML) INTRAVENOUS
Status: DISCONTINUED | OUTPATIENT
Start: 2019-03-13 | End: 2019-03-13

## 2019-03-13 RX ORDER — ALBUMIN HUMAN 50 G/1000ML
SOLUTION INTRAVENOUS CONTINUOUS PRN
Status: DISCONTINUED | OUTPATIENT
Start: 2019-03-13 | End: 2019-03-13

## 2019-03-13 RX ORDER — MEPERIDINE HYDROCHLORIDE 25 MG/ML
12.5 INJECTION INTRAMUSCULAR; INTRAVENOUS; SUBCUTANEOUS ONCE AS NEEDED
Status: DISCONTINUED | OUTPATIENT
Start: 2019-03-13 | End: 2019-03-13 | Stop reason: HOSPADM

## 2019-03-13 RX ORDER — PROPOFOL 10 MG/ML
VIAL (ML) INTRAVENOUS
Status: DISCONTINUED | OUTPATIENT
Start: 2019-03-13 | End: 2019-03-13

## 2019-03-13 RX ORDER — SODIUM CHLORIDE 0.9 % (FLUSH) 0.9 %
3 SYRINGE (ML) INJECTION
Status: DISCONTINUED | OUTPATIENT
Start: 2019-03-13 | End: 2019-03-13 | Stop reason: HOSPADM

## 2019-03-13 RX ORDER — FAMOTIDINE 20 MG/1
20 TABLET, FILM COATED ORAL
Status: COMPLETED | OUTPATIENT
Start: 2019-03-13 | End: 2019-03-13

## 2019-03-13 RX ORDER — BUPIVACAINE HYDROCHLORIDE 5 MG/ML
INJECTION, SOLUTION EPIDURAL; INTRACAUDAL
Status: DISCONTINUED | OUTPATIENT
Start: 2019-03-13 | End: 2019-03-13 | Stop reason: HOSPADM

## 2019-03-13 RX ORDER — HYDROMORPHONE HYDROCHLORIDE 2 MG/ML
0.4 INJECTION, SOLUTION INTRAMUSCULAR; INTRAVENOUS; SUBCUTANEOUS EVERY 5 MIN PRN
Status: DISCONTINUED | OUTPATIENT
Start: 2019-03-13 | End: 2019-03-13 | Stop reason: HOSPADM

## 2019-03-13 RX ORDER — ACETAMINOPHEN 10 MG/ML
INJECTION, SOLUTION INTRAVENOUS
Status: DISCONTINUED | OUTPATIENT
Start: 2019-03-13 | End: 2019-03-13

## 2019-03-13 RX ORDER — NEOSTIGMINE METHYLSULFATE 1 MG/ML
INJECTION, SOLUTION INTRAVENOUS
Status: DISCONTINUED | OUTPATIENT
Start: 2019-03-13 | End: 2019-03-13

## 2019-03-13 RX ORDER — OXYCODONE HYDROCHLORIDE 5 MG/1
5 TABLET ORAL ONCE AS NEEDED
Status: DISCONTINUED | OUTPATIENT
Start: 2019-03-13 | End: 2019-03-13 | Stop reason: HOSPADM

## 2019-03-13 RX ORDER — PHENYLEPHRINE HYDROCHLORIDE 10 MG/ML
INJECTION INTRAVENOUS
Status: DISCONTINUED | OUTPATIENT
Start: 2019-03-13 | End: 2019-03-13

## 2019-03-13 RX ORDER — CEFAZOLIN SODIUM 1 G/3ML
2 INJECTION, POWDER, FOR SOLUTION INTRAMUSCULAR; INTRAVENOUS
Status: COMPLETED | OUTPATIENT
Start: 2019-03-13 | End: 2019-03-13

## 2019-03-13 RX ORDER — SODIUM CHLORIDE, SODIUM LACTATE, POTASSIUM CHLORIDE, CALCIUM CHLORIDE 600; 310; 30; 20 MG/100ML; MG/100ML; MG/100ML; MG/100ML
INJECTION, SOLUTION INTRAVENOUS CONTINUOUS
Status: DISCONTINUED | OUTPATIENT
Start: 2019-03-13 | End: 2019-03-13 | Stop reason: HOSPADM

## 2019-03-13 RX ORDER — ONDANSETRON HYDROCHLORIDE 2 MG/ML
INJECTION, SOLUTION INTRAMUSCULAR; INTRAVENOUS
Status: DISCONTINUED | OUTPATIENT
Start: 2019-03-13 | End: 2019-03-13

## 2019-03-13 RX ORDER — LIDOCAINE HYDROCHLORIDE 10 MG/ML
1 INJECTION, SOLUTION EPIDURAL; INFILTRATION; INTRACAUDAL; PERINEURAL ONCE
Status: DISCONTINUED | OUTPATIENT
Start: 2019-03-13 | End: 2019-03-13 | Stop reason: HOSPADM

## 2019-03-13 RX ORDER — ROCURONIUM BROMIDE 10 MG/ML
INJECTION, SOLUTION INTRAVENOUS
Status: DISCONTINUED | OUTPATIENT
Start: 2019-03-13 | End: 2019-03-13

## 2019-03-13 RX ORDER — HYDROCODONE BITARTRATE AND ACETAMINOPHEN 10; 325 MG/1; MG/1
1 TABLET ORAL EVERY 4 HOURS PRN
Qty: 30 TABLET | Refills: 0 | Status: SHIPPED | OUTPATIENT
Start: 2019-03-13 | End: 2019-10-28

## 2019-03-13 RX ORDER — DEXAMETHASONE SODIUM PHOSPHATE 4 MG/ML
INJECTION, SOLUTION INTRA-ARTICULAR; INTRALESIONAL; INTRAMUSCULAR; INTRAVENOUS; SOFT TISSUE
Status: DISCONTINUED | OUTPATIENT
Start: 2019-03-13 | End: 2019-03-13

## 2019-03-13 RX ORDER — MIDAZOLAM HYDROCHLORIDE 1 MG/ML
INJECTION INTRAMUSCULAR; INTRAVENOUS
Status: DISCONTINUED | OUTPATIENT
Start: 2019-03-13 | End: 2019-03-13

## 2019-03-13 RX ORDER — LIDOCAINE HYDROCHLORIDE 10 MG/ML
0.5 INJECTION, SOLUTION EPIDURAL; INFILTRATION; INTRACAUDAL; PERINEURAL ONCE
Status: DISCONTINUED | OUTPATIENT
Start: 2019-03-13 | End: 2019-03-13 | Stop reason: HOSPADM

## 2019-03-13 RX ORDER — GLYCOPYRROLATE 0.2 MG/ML
INJECTION INTRAMUSCULAR; INTRAVENOUS
Status: DISCONTINUED | OUTPATIENT
Start: 2019-03-13 | End: 2019-03-13

## 2019-03-13 RX ORDER — BACITRACIN 50000 [IU]/1
INJECTION, POWDER, FOR SOLUTION INTRAMUSCULAR
Status: DISCONTINUED | OUTPATIENT
Start: 2019-03-13 | End: 2019-03-13 | Stop reason: HOSPADM

## 2019-03-13 RX ORDER — OXYCODONE HYDROCHLORIDE 5 MG/1
5 TABLET ORAL
Status: DISCONTINUED | OUTPATIENT
Start: 2019-03-13 | End: 2019-03-13 | Stop reason: HOSPADM

## 2019-03-13 RX ORDER — LIDOCAINE HYDROCHLORIDE AND EPINEPHRINE 10; 10 MG/ML; UG/ML
INJECTION, SOLUTION INFILTRATION; PERINEURAL
Status: DISCONTINUED | OUTPATIENT
Start: 2019-03-13 | End: 2019-03-13 | Stop reason: HOSPADM

## 2019-03-13 RX ORDER — FENTANYL CITRATE 50 UG/ML
INJECTION, SOLUTION INTRAMUSCULAR; INTRAVENOUS
Status: DISCONTINUED | OUTPATIENT
Start: 2019-03-13 | End: 2019-03-13

## 2019-03-13 RX ORDER — ONDANSETRON 2 MG/ML
4 INJECTION INTRAMUSCULAR; INTRAVENOUS DAILY PRN
Status: DISCONTINUED | OUTPATIENT
Start: 2019-03-13 | End: 2019-03-13 | Stop reason: HOSPADM

## 2019-03-13 RX ORDER — FENTANYL CITRATE 50 UG/ML
25 INJECTION, SOLUTION INTRAMUSCULAR; INTRAVENOUS EVERY 5 MIN PRN
Status: DISCONTINUED | OUTPATIENT
Start: 2019-03-13 | End: 2019-03-13 | Stop reason: HOSPADM

## 2019-03-13 RX ADMIN — GLYCOPYRROLATE 0.6 MG: 0.2 INJECTION, SOLUTION INTRAMUSCULAR; INTRAVENOUS at 11:03

## 2019-03-13 RX ADMIN — SODIUM CHLORIDE, SODIUM LACTATE, POTASSIUM CHLORIDE, AND CALCIUM CHLORIDE: 600; 310; 30; 20 INJECTION, SOLUTION INTRAVENOUS at 09:03

## 2019-03-13 RX ADMIN — FENTANYL CITRATE 50 MCG: 50 INJECTION, SOLUTION INTRAMUSCULAR; INTRAVENOUS at 08:03

## 2019-03-13 RX ADMIN — NEOSTIGMINE METHYLSULFATE 5 MG: 1 INJECTION INTRAVENOUS at 11:03

## 2019-03-13 RX ADMIN — FAMOTIDINE 20 MG: 20 TABLET, FILM COATED ORAL at 06:03

## 2019-03-13 RX ADMIN — SODIUM CHLORIDE, SODIUM LACTATE, POTASSIUM CHLORIDE, AND CALCIUM CHLORIDE: 600; 310; 30; 20 INJECTION, SOLUTION INTRAVENOUS at 07:03

## 2019-03-13 RX ADMIN — ALBUMIN (HUMAN): 2.5 SOLUTION INTRAVENOUS at 10:03

## 2019-03-13 RX ADMIN — ONDANSETRON 4 MG: 2 INJECTION, SOLUTION INTRAMUSCULAR; INTRAVENOUS at 11:03

## 2019-03-13 RX ADMIN — HYDROMORPHONE HYDROCHLORIDE 0.4 MG: 2 INJECTION INTRAMUSCULAR; INTRAVENOUS; SUBCUTANEOUS at 12:03

## 2019-03-13 RX ADMIN — PROMETHAZINE HYDROCHLORIDE 6.25 MG: 25 INJECTION INTRAMUSCULAR; INTRAVENOUS at 12:03

## 2019-03-13 RX ADMIN — LIDOCAINE HYDROCHLORIDE 80 MG: 20 INJECTION, SOLUTION INTRAVENOUS at 08:03

## 2019-03-13 RX ADMIN — PROPOFOL 170 MG: 10 INJECTION, EMULSION INTRAVENOUS at 08:03

## 2019-03-13 RX ADMIN — FENTANYL CITRATE 50 MCG: 50 INJECTION, SOLUTION INTRAMUSCULAR; INTRAVENOUS at 10:03

## 2019-03-13 RX ADMIN — FENTANYL CITRATE 50 MCG: 50 INJECTION, SOLUTION INTRAMUSCULAR; INTRAVENOUS at 12:03

## 2019-03-13 RX ADMIN — FENTANYL CITRATE 100 MCG: 50 INJECTION, SOLUTION INTRAMUSCULAR; INTRAVENOUS at 08:03

## 2019-03-13 RX ADMIN — ROCURONIUM BROMIDE 50 MG: 10 INJECTION INTRAVENOUS at 08:03

## 2019-03-13 RX ADMIN — ROCURONIUM BROMIDE 10 MG: 10 INJECTION INTRAVENOUS at 09:03

## 2019-03-13 RX ADMIN — FENTANYL CITRATE 50 MCG: 50 INJECTION, SOLUTION INTRAMUSCULAR; INTRAVENOUS at 09:03

## 2019-03-13 RX ADMIN — PHENYLEPHRINE HYDROCHLORIDE 100 MCG: 10 INJECTION INTRAVENOUS at 10:03

## 2019-03-13 RX ADMIN — CEFAZOLIN 2 G: 330 INJECTION, POWDER, FOR SOLUTION INTRAMUSCULAR; INTRAVENOUS at 08:03

## 2019-03-13 RX ADMIN — ACETAMINOPHEN 1000 MG: 10 INJECTION, SOLUTION INTRAVENOUS at 08:03

## 2019-03-13 RX ADMIN — ALBUMIN (HUMAN): 2.5 SOLUTION INTRAVENOUS at 09:03

## 2019-03-13 RX ADMIN — PROPOFOL 30 MG: 10 INJECTION, EMULSION INTRAVENOUS at 08:03

## 2019-03-13 RX ADMIN — DEXAMETHASONE SODIUM PHOSPHATE 8 MG: 4 INJECTION, SOLUTION INTRAMUSCULAR; INTRAVENOUS at 08:03

## 2019-03-13 RX ADMIN — MIDAZOLAM HYDROCHLORIDE 2 MG: 1 INJECTION, SOLUTION INTRAMUSCULAR; INTRAVENOUS at 08:03

## 2019-03-13 NOTE — DISCHARGE INSTRUCTIONS
Discharge Instructions for Breast Reduction  You had a procedure called breast reduction, or reduction mammoplasty. Breast reduction is a surgical procedure to decrease the size of a womans breast. Women choose breast reduction to relieve back pain, to decrease the size of the breasts for appearance, or to balance a difference in breast size.    Home care  · Take your medication exactly as directed.  · Wear the special bra or bandage you were given before discharge as directed by your health care provider. Expect to wear the bra or wrap 24 hours a day for about 3 to 4 weeks. You may remove it when you shower, starting 3 days after your surgery.  · Shower when directed by MD. Gently wash your incision site. Pat the incision dry. Dont apply lotions, oils, or creams.  · Do not submerge your incision in a tub bath until it is completely closed. Doing so may introduce bacteria and cause an infection.  · You will have a dressing over your incisions. Be sure to ask your healthcare provider how to care for your dressing. Your stitches may dissolve on their own. Or, they may be removed at a follow-up appointment. If you have small, white adhesive strips at your incision sites, do not remove them. They will come off on their own.  · Make an appointment to have your stitches or staples removed in 7 to 10 days.    Activity  · Dont raise your arms above breast level until your health care provider says its OK.  · Dont lift, push, or pull anything heavier than 10 pounds for at least 5 to 7 days.  · Sleep on your back. Use pillows to keep the upper part of your body elevated.  · Dont drive until your healthcare provider says its OK.    Follow-up  Make a follow-up appointment as directed by our staff.    When to call your healthcare provider  Call your healthcare provider right away if you have any of the following:  · Trouble breathing, sudden shortness of breath or gradual shortness of breath that gets worse  · Bleeding  or drainage through the special bra or bandage  · Pain that is not relieved by medication  · More soreness, swelling, or bruising on 1 breast than the other  · Redness in the breasts or warmth to the touch  · Any rapid swelling in 1 area or breast  · Sudden chest pain  · Fever of 100.4°F (38°C) or higher, or chills  · Increasing pain with or without activity         Discharge Instructions: After Your Surgery  Youve just had surgery. During surgery, you were given medicine called anesthesia to keep you relaxed and free of pain. After surgery, you may have some pain or nausea. This is common. Here are some tips for feeling better and getting well after surgery.     Stay on schedule with your medicine.     Going home  Your healthcare provider will show you how to take care of yourself when you go home. He or she will also answer your questions. Have an adult family member or friend drive you home. For the first 24 hours after your surgery:    · Do not drive or use heavy equipment.  · Do not make important decisions or sign legal papers.  · Do not drink alcohol.  · Have someone stay with you, if needed. He or she can watch for problems and help keep you safe.    Be sure to go to all follow-up visits with your healthcare provider. And rest after your surgery for as long as your healthcare provider tells you to.    Coping with pain  If you have pain after surgery, pain medicine will help you feel better. Take it as told, before pain becomes severe. Also, ask your healthcare provider or pharmacist about other ways to control pain. This might be with heat, ice, or relaxation. And follow any other instructions your surgeon or nurse gives you.    Tips for taking pain medicine  To get the best relief possible, remember these points:    · Pain medicines can upset your stomach. Taking them with a little food may help.  · Most pain relievers taken by mouth need at least 20 to 30 minutes to start to work.  · Taking medicine on a  schedule can help you remember to take it. Try to time your medicine so that you can take it before starting an activity. This might be before you get dressed, go for a walk, or sit down for dinner.  · Constipation is a common side effect of pain medicines. Call your healthcare provider before taking any medicines such as laxatives or stool softeners to help ease constipation. Also ask if you should skip any foods. Drinking lots of fluids and eating foods such as fruits and vegetables that are high in fiber can also help. Remember, do not take laxatives unless your surgeon has prescribed them.  · Drinking alcohol and taking pain medicine can cause dizziness and slow your breathing. It can even be deadly. Do not drink alcohol while taking pain medicine.  · Pain medicine can make you react more slowly to things. Do not drive or run machinery while taking pain medicine.    Your healthcare provider may tell you to take acetaminophen to help ease your pain. Ask him or her how much you are supposed to take each day. Acetaminophen or other pain relievers may interact with your prescription medicines or other over-the-counter (OTC) medicines. Some prescription medicines have acetaminophen and other ingredients. Using both prescription and OTC acetaminophen for pain can cause you to overdose. Read the labels on your OTC medicines with care. This will help you to clearly know the list of ingredients, how much to take, and any warnings. It may also help you not take too much acetaminophen. If you have questions or do not understand the information, ask your pharmacist or healthcare provider to explain it to you before you take the OTC medicine.    Managing nausea  Some people have an upset stomach after surgery. This is often because of anesthesia, pain, or pain medicine, or the stress of surgery. These tips will help you handle nausea and eat healthy foods as you get better. If you were on a special food plan before surgery,  ask your healthcare provider if you should follow it while you get better. These tips may help:    · Do not push yourself to eat. Your body will tell you when to eat and how much.  · Start off with clear liquids and soup. They are easier to digest.  · Next try semi-solid foods, such as mashed potatoes, applesauce, and gelatin, as you feel ready.  · Slowly move to solid foods. Dont eat fatty, rich, or spicy foods at first.  · Do not force yourself to have 3 large meals a day. Instead eat smaller amounts more often.  · Take pain medicines with a small amount of solid food, such as crackers or toast, to avoid nausea.     Call your surgeon if  · You still have pain an hour after taking medicine. The medicine may not be strong enough.  · You feel too sleepy, dizzy, or groggy. The medicine may be too strong.  · You have side effects like nausea, vomiting, or skin changes, such as rash, itching, or hives.       If you have obstructive sleep apnea  You were given anesthesia medicine during surgery to keep you comfortable and free of pain. After surgery, you may have more apnea spells because of this medicine and other medicines you were given. The spells may last longer than usual.   At home:    · Keep using the continuous positive airway pressure (CPAP) device when you sleep. Unless your healthcare provider tells you not to, use it when you sleep, day or night. CPAP is a common device used to treat obstructive sleep apnea.  · Talk with your provider before taking any pain medicine, muscle relaxants, or sedatives. Your provider will tell you about the possible dangers of taking these medicines.    Date Last Reviewed: 12/1/2016  © 2012-4430 [employer] [address] [place] [address], [place] [address]. All rights reserved. This information is not intended as a substitute for professional medical care. Always follow your healthcare professional's instructions.    PLEASE FOLLOW ANY OTHER INSTRUCTIONS PROVIDED TO YOU  BY DR. SIFUENTES!

## 2019-03-13 NOTE — PLAN OF CARE
Kristy Hernandez has met all discharge criteria from Phase II. Vital Signs are stable, ambulating  without difficulty. Discharge instructions given, patient verbalized understanding. Discharged from facility via wheelchair in stable condition.

## 2019-03-13 NOTE — TRANSFER OF CARE
"Anesthesia Transfer of Care Note    Patient: Kristy Hernandez    Procedure(s) Performed: Procedure(s) (LRB):  MAMMOPLASTY, REDUCTION, BILATERAL (Bilateral)    Patient location: PACU    Anesthesia Type: general    Transport from OR: Transported from OR on 2-3 L/min O2 by NC with adequate spontaneous ventilation    Post pain: adequate analgesia    Post assessment: no apparent anesthetic complications    Post vital signs: stable    Level of consciousness: awake and alert    Nausea/Vomiting: no nausea/vomiting    Complications: none    Transfer of care protocol was followed      Last vitals:   Visit Vitals  BP (!) 144/96 (BP Location: Left arm, Patient Position: Sitting)   Pulse 80   Resp 16   Ht 5' 2" (1.575 m)   Wt 85.3 kg (187 lb 16 oz)   SpO2 100%   Breastfeeding? No   BMI 34.39 kg/m²     "

## 2019-03-13 NOTE — INTERVAL H&P NOTE
The patient has been examined and the H&P has been reviewed:    I concur with the findings and no changes have occurred since H&P was written.    Anesthesia/Surgery risks, benefits and alternative options discussed and understood by patient/family.          Active Hospital Problems    Diagnosis  POA    Breast hypertrophy in female [N62]  Yes      Resolved Hospital Problems   No resolved problems to display.

## 2019-03-13 NOTE — ANESTHESIA POSTPROCEDURE EVALUATION
"Anesthesia Post Evaluation    Patient: Kristy Hernandez    Procedure(s) Performed: Procedure(s) (LRB):  MAMMOPLASTY, REDUCTION, BILATERAL (Bilateral)    Final Anesthesia Type: general  Patient location during evaluation: PACU  Patient participation: Yes- Able to Participate  Level of consciousness: awake and alert  Post-procedure vital signs: reviewed and stable  Pain management: adequate  Airway patency: patent  PONV status at discharge: No PONV  Anesthetic complications: no      Cardiovascular status: blood pressure returned to baseline and stable  Respiratory status: unassisted, spontaneous ventilation and room air  Hydration status: euvolemic  Follow-up not needed.        Visit Vitals  /71   Pulse 103   Temp 36.9 °C (98.4 °F) (Oral)   Resp 20   Ht 5' 2" (1.575 m)   Wt 85.3 kg (187 lb 16 oz)   SpO2 100%   Breastfeeding? No   BMI 34.39 kg/m²       Pain/Delfin Score: Pain Rating Prior to Med Admin: 6 (3/13/2019 12:51 PM)  Pain Rating Post Med Admin: 8 (3/13/2019 12:31 PM)  Delfin Score: 9 (3/13/2019 12:45 PM)        "

## 2019-03-13 NOTE — BRIEF OP NOTE
[facility]  Brief Operative Note     SUMMARY     Surgery Date: 3/13/2019     Surgeon(s) and Role:     * Ghanshyam Forte MD - Primary    Assisting Surgeon: None    Pre-op Diagnosis:  Breast hypertrophy [N62]    Post-op Diagnosis:  Post-Op Diagnosis Codes:     * Breast hypertrophy [N62]    Procedure(s) (LRB):  MAMMOPLASTY, REDUCTION, BILATERAL (Bilateral)    Anesthesia: General    Description of the findings of the procedure: BBR    Findings/Key Components: see op note    Estimated Blood Loss: * No values recorded between 3/13/2019  8:39 AM and 3/13/2019 12:07 PM *         Specimens:   Specimen (12h ago, onward)    Start     Ordered    03/13/19 1055  Specimen to Pathology - Surgery  Once     Comments:  1. Right Breast2. Left Breast     Start Status   03/13/19 1055 Collected (03/13/19 1055)       03/13/19 1055          Discharge Note    SUMMARY     Admit Date: 3/13/2019    Discharge Date and Time:  03/13/2019 12:07 PM    Hospital Course (synopsis of major diagnoses, care, treatment, and services provided during the course of the hospital stay): taken to OR for BBR and discharged from same day when appropriate criteria met.     Final Diagnosis: Post-Op Diagnosis Codes:     * Breast hypertrophy [N62]    Disposition: Home or Self Care    Follow Up/Patient Instructions:     Medications:  Reconciled Home Medications:      Medication List      START taking these medications    HYDROcodone-acetaminophen  mg per tablet  Commonly known as:  NORCO  Take 1 tablet by mouth every 4 (four) hours as needed for Pain.        CONTINUE taking these medications    famciclovir 500 MG tablet  Commonly known as:  FAMVIR  Take 500 mg by mouth 3 (three) times daily.     phentermine 37.5 mg tablet  Commonly known as:  ADIPEX-P  Take 1 tablet (37.5 mg total) by mouth before breakfast.     ROBAXIN 500 MG Tab  Generic drug:  methocarbamol  Robaxin 500 mg tablet   Take 1 tablet as needed by oral route.     TRINTELLIX 20  mg Tab  Generic drug:  vortioxetine  Take 1 tablet (20 mg total) by mouth once daily.     valACYclovir 1000 MG tablet  Commonly known as:  VALTREX  Take 1 tablet (1,000 mg total) by mouth once daily.          Discharge Procedure Orders   Diet general     Wear Surgical Bra and/or Girdle at all times (may remove for short times to shower)   Order Comments: Wear Surgical Bra and/or Girdle at all times (may remove for short times to shower)     Discharge instructions (Specify)   Order Comments: Okay to shower in 72 hours and then replace ABD pads and surgical bra     Call MD for:  temperature >100.4     Call MD for:  persistent nausea and vomiting     Call MD for:  severe uncontrolled pain     Call MD for:  difficulty breathing, headache or visual disturbances     Call MD for:  redness, tenderness, or signs of infection (pain, swelling, redness, odor or green/yellow discharge around incision site)     Weight bearing restrictions (specify)   Order Comments: No heavy lifting, pushing, or pulling over 5 lbs     Follow-up Information     Ghanshyam Forte MD In 1 week.    Specialty:  Plastic Surgery  Contact information:  [address] [address] [phone]

## 2019-03-13 NOTE — OP NOTE
OPERATIVE REPORT    Date of Operation: 3/13/2019  Name: Kristy Hernandez  MRN: [id number]    SURGEON  EARL SIFUENTES M.D.    RESIDENT  VLADIMIR CROUCH MD    PREOPERATIVE DIAGNOSIS  BREAST HYPERTROPHY    POSTOPERATIVE DIAGNOSIS  BREAST HYPERTROPHY    PROCEDURE PERFORMED  REDUCTION MAMMOPLASTY, BILATERAL.    ANESTHESIA:  GENERAL ENDOTRACHEAL    ESTIMATED BLOOD LOSS:  75 ml    URINE OUTPUT:  320 ml    FLUIDS GIVEN:  CRYSTALLOID 2100 ml  BLOOD PRODUCTS: NONE  COLLOID: NONE    SPECIMENS: BILATERAL BREAST TISSUE, ROUTINE HISTOLOGY    CULTURES: NONE    DRAINS: NONE    DISPOSITION: GOOD AND STABLE CONDITION TO PACU    COMPLICATIONS: NONE    COUNTS: SPONGE AND NEEDLE COUNTS CORRECT    FINDINGS  Ovalle pattern  SN-N 22 cm  Pedicle 10 cm superomedially based  Areolar diameter of 42 mm  Resection - right breast 801 gm, left breast 826 gm    INDICATIONS  Kristy Hernandez is a 43 y.o. female who presented with back, neck, and shoulder pain associated with large pendulous breasts.  After review of risks, benefits, alternatives, potential complications and expected outcomes, the patient indicated she wished to proceed with breast reduction and gave informed consent. Preoperative photographs were obtained with her permission.  Preoperative screening mammography was normal.    OPERATIVE PROCEDURE  The patient was seen in the preoperative holding area prior to sedation. She had no recent change in medical history and pre-operative laboratory values were normal. Markings were placed in the upright standing position using a wise pattern skin reduction technique with repositioning of the nipple areolar complex at 22 cm from the sternal notch which corresponded to a projection of her inframammary fold onto the upper pole of the breast skin. Superomedial pedicles were designed, measuring 10 cm in width. The breasts were roughly symmetric.    She was then taken to the operating room where upon entry a surgical time out verified her identify,  diagnosis, nature, and sites of procedures. She was placed on the operating room table over a surgical bra and sequential compressive devices were placed and deployed for venous thromboembolic protection. Prophylactic antibiotics were infused using cefazolin 2 gm intravenously.    In the supine position she was anesthetized and endotracheally intubated. After securing her arms to arm boards away from her body axis, her anterior chest was sterilely prepped and draped. Markings were re-confirmed. The areola was circumscribed to 42 mm with the aid of a cookie cutter. A solution of 0.5% lidocaine and 1:100,000 epinephrine and 0.25% marcaine solution was used, infiltrating 40 cc per breast.    The right breast was constricted at its base with a laparotomy sponge used as a tourniquet. Incisions were then made and the superomedial pedicle de-epithelialized. The skin peripheral to the pedicle was incised full thickness to the deep chest wall fascia, left thick for maximum neurovascular supply.  Breast tissue was removed from the lateral, medial and inferior pole around with superomedial pedicle according to the wise pattern skin resection.  The lateral and medial breast flaps were left 3-4 cm thick for maximal perfusion.  Resection was performed until 800 gm of breast tissue was resected at which point the size approximated her desired cup size. The incisions were temporarily closed with staples.    The areola of the left breast was circumscribed to 42 mm with the aid of a cookie cutter. The left breast was likewise constricted at its base with a laparotomy sponge used as a tourniquet. Incisions were then made and the superomedial pedicle de-epithelialized. TThe skin peripheral to the pedicle was incised full thickness to the deep chest wall fascia, left thick for maximum neurovascular supply.  Breast tissue was removed from the lateral, medial and inferior pole around with superomedial pedicle according to the wise pattern  skin resection.  The lateral and medial breast flaps were left 3-4 cm thick for maximal perfusion.  Resection was performed until 800 gm of breast tissue was resected at which point breast symmetry was accomplished. The field was irrigated with antibiotic saline and hemostasis completed. The incisions were temporarily closed with staples.    She was placed in a semi-upright sitting position and final tailor-tacking performed to achieve ultimate shape and symmetry. Further skin resection was performed until final symmetry achieved. The total weight resected from the right breast was 801 gm and 826 gm from the left breast. The removed breast tissue was sent for routine pathologic examination.    The staples were removed and the fields irrigated with antibiotic saline and hemostasis obtained with cautery. The breasts were then closed. 2-0 Vicryl sutures was used to perform deep pillar stitches. 2-0 Vicryl was used to approximate breast parenchyma. Closure of the incisions was then was performed in layers with interrupted 3-0 monocryl deep dermal sutures placed in an inverted fashion. The skin was closed with running subcuticular monocryl 4-0. All incisions were then reinforced with dermabond    Circulation to the breast skin flaps and each nipple areolar complex was normal at the completion of the case. Sterile dressings were placed and secured within her surgical bra. She was then awakened, when breathing spontaneously and able to protect her airway, she was extubated and taken to recovery room in good condition.    Electronically signed by:  Ghanshyam Forte  3/13/2019  12:05 PM

## 2019-03-21 ENCOUNTER — PATIENT MESSAGE (OUTPATIENT)
Dept: PLASTIC SURGERY | Facility: CLINIC | Age: 44
End: 2019-03-21

## 2019-03-21 ENCOUNTER — OFFICE VISIT (OUTPATIENT)
Dept: PLASTIC SURGERY | Facility: CLINIC | Age: 44
End: 2019-03-21
Payer: COMMERCIAL

## 2019-03-21 VITALS — HEIGHT: 62 IN | BODY MASS INDEX: 34.61 KG/M2 | WEIGHT: 188.06 LBS

## 2019-03-21 DIAGNOSIS — Z51.89 AFTERCARE: Primary | ICD-10-CM

## 2019-03-21 PROCEDURE — 99024 POSTOP FOLLOW-UP VISIT: CPT | Mod: S$GLB,,, | Performed by: PLASTIC SURGERY

## 2019-03-21 PROCEDURE — 99024 PR POST-OP FOLLOW-UP VISIT: ICD-10-PCS | Mod: S$GLB,,, | Performed by: PLASTIC SURGERY

## 2019-03-21 NOTE — PROGRESS NOTES
"POST-OPERATIVE EXAM    CC  No chief complaint on file.      PROCEDURE  Breast reduction    SUBJECTIVE  Preoperative symptoms have improved.  She is doing well, minor soreness, path reviewed with her, including finding of PASH on the right breast; discussed with Dr. Marx; no further work-up or imaging needed for this finding    PHYSICAL EXAM  Ht 5' 2" (1.575 m)   Wt 85.3 kg (188 lb 0.8 oz)   BMI 34.40 kg/m²   Breast symmetry and shape good  Minor bruising and swelling, greater on the left  Healing primarily  Scars pink, without hypertrophy  No masses  Nipples sensate  NAC healthy, viable    PATHOLOGY  Reviewed with patient    ASSESSMENT  Encounter Diagnoses   Name Primary?    Aftercare Yes     No signs of complications.  She is doing satisfactorily after breast reduction. Preoperative symptoms are improved.  Allow time for contour and scar maturation.    PLAN  Care/instructions were reviewed, written handout given (see AVS).  Continue sport bra/surgical bra  No upper body exercise/heavy lifting >2-5 lbs x 1 month  Ok to shower  F/u in 2-3 weeks     Electronically signed by:  Ghanshyam Forte  3/21/2019  11:29 AM      "

## 2019-03-27 ENCOUNTER — PATIENT MESSAGE (OUTPATIENT)
Dept: PLASTIC SURGERY | Facility: CLINIC | Age: 44
End: 2019-03-27

## 2019-04-02 ENCOUNTER — PATIENT MESSAGE (OUTPATIENT)
Dept: PLASTIC SURGERY | Facility: CLINIC | Age: 44
End: 2019-04-02

## 2019-04-04 ENCOUNTER — OFFICE VISIT (OUTPATIENT)
Dept: PLASTIC SURGERY | Facility: CLINIC | Age: 44
End: 2019-04-04
Payer: COMMERCIAL

## 2019-04-04 VITALS
HEART RATE: 79 BPM | WEIGHT: 188.06 LBS | HEIGHT: 62 IN | BODY MASS INDEX: 34.61 KG/M2 | SYSTOLIC BLOOD PRESSURE: 126 MMHG | DIASTOLIC BLOOD PRESSURE: 75 MMHG

## 2019-04-04 DIAGNOSIS — Z51.89 AFTERCARE: Primary | ICD-10-CM

## 2019-04-04 PROCEDURE — 99024 POSTOP FOLLOW-UP VISIT: CPT | Mod: S$GLB,,, | Performed by: PLASTIC SURGERY

## 2019-04-04 PROCEDURE — 99024 PR POST-OP FOLLOW-UP VISIT: ICD-10-PCS | Mod: S$GLB,,, | Performed by: PLASTIC SURGERY

## 2019-04-04 NOTE — PROGRESS NOTES
"POST-OPERATIVE EXAM    CC  No chief complaint on file.    PROCEDURE  Bilateral breast reduction    SUBJECTIVE  Preoperative symptoms have improved, concerned about T-point junction    PHYSICAL EXAM  /75   Pulse 79   Ht 5' 2" (1.575 m)   Wt 85.3 kg (188 lb 0.8 oz)   BMI 34.40 kg/m²   Breast symmetry and shape good  Minor bruising, greater on the left  Healing primarily  Scars pink, without hypertrophy  No masses  Nipple sensation diminished bilaterally  NAC healthy, viable    ASSESSMENT  Encounter Diagnoses   Name Primary?    Aftercare Yes     No signs of complications.  She is doing satisfactorily after breast reduction. Preoperative symptoms are improved.  Allow time for contour and scar maturation.    PLAN  Care/instructions were reviewed, written handout given (see AVS).  Continue sport bra/surgical bra  No upper body exercise/heavy lifting x 1 month  Ok to shower  F/u in 3 weeks    Electronically signed by:  Ghanshyam Forte  4/4/2019  2:40 PM    "

## 2019-04-11 ENCOUNTER — OFFICE VISIT (OUTPATIENT)
Dept: FAMILY MEDICINE | Facility: CLINIC | Age: 44
End: 2019-04-11
Payer: COMMERCIAL

## 2019-04-11 VITALS
DIASTOLIC BLOOD PRESSURE: 75 MMHG | OXYGEN SATURATION: 98 % | TEMPERATURE: 99 F | HEIGHT: 62 IN | RESPIRATION RATE: 18 BRPM | WEIGHT: 186 LBS | HEART RATE: 101 BPM | SYSTOLIC BLOOD PRESSURE: 125 MMHG | BODY MASS INDEX: 34.23 KG/M2

## 2019-04-11 DIAGNOSIS — J40 BRONCHITIS: ICD-10-CM

## 2019-04-11 DIAGNOSIS — J06.9 UPPER RESPIRATORY TRACT INFECTION, UNSPECIFIED TYPE: Primary | ICD-10-CM

## 2019-04-11 PROCEDURE — 99213 OFFICE O/P EST LOW 20 MIN: CPT | Mod: 25,S$GLB,, | Performed by: NURSE PRACTITIONER

## 2019-04-11 PROCEDURE — 96372 PR INJECTION,THERAP/PROPH/DIAG2ST, IM OR SUBCUT: ICD-10-PCS | Mod: S$GLB,,, | Performed by: NURSE PRACTITIONER

## 2019-04-11 PROCEDURE — 3008F BODY MASS INDEX DOCD: CPT | Mod: S$GLB,,, | Performed by: NURSE PRACTITIONER

## 2019-04-11 PROCEDURE — 99999 PR PBB SHADOW E&M-EST. PATIENT-LVL III: ICD-10-PCS | Mod: PBBFAC,,, | Performed by: NURSE PRACTITIONER

## 2019-04-11 PROCEDURE — 96372 THER/PROPH/DIAG INJ SC/IM: CPT | Mod: S$GLB,,, | Performed by: NURSE PRACTITIONER

## 2019-04-11 PROCEDURE — 99999 PR PBB SHADOW E&M-EST. PATIENT-LVL III: CPT | Mod: PBBFAC,,, | Performed by: NURSE PRACTITIONER

## 2019-04-11 PROCEDURE — 3008F PR BODY MASS INDEX (BMI) DOCUMENTED: ICD-10-PCS | Mod: S$GLB,,, | Performed by: NURSE PRACTITIONER

## 2019-04-11 PROCEDURE — 99213 PR OFFICE/OUTPT VISIT, EST, LEVL III, 20-29 MIN: ICD-10-PCS | Mod: 25,S$GLB,, | Performed by: NURSE PRACTITIONER

## 2019-04-11 RX ORDER — DEXAMETHASONE SODIUM PHOSPHATE 4 MG/ML
8 INJECTION, SOLUTION INTRA-ARTICULAR; INTRALESIONAL; INTRAMUSCULAR; INTRAVENOUS; SOFT TISSUE ONCE
Status: COMPLETED | OUTPATIENT
Start: 2019-04-11 | End: 2019-04-11

## 2019-04-11 RX ORDER — BENZONATATE 100 MG/1
100 CAPSULE ORAL 3 TIMES DAILY PRN
Qty: 30 CAPSULE | Refills: 0 | Status: SHIPPED | OUTPATIENT
Start: 2019-04-11 | End: 2019-04-21

## 2019-04-11 RX ADMIN — DEXAMETHASONE SODIUM PHOSPHATE 8 MG: 4 INJECTION, SOLUTION INTRA-ARTICULAR; INTRALESIONAL; INTRAMUSCULAR; INTRAVENOUS; SOFT TISSUE at 04:04

## 2019-04-11 NOTE — PROGRESS NOTES
Subjective:       Patient ID: Kristy Hernandez is a 43 y.o. female.    Chief Complaint: Fever; Fatigue; and Cough    Kristy Hernandez is a 43 year old patient who presents to the clinic today for possible flu. She reports last night her body started hurting and she has been having a nonproductive cough. She reports she had a temp this am of 100.8. She has not been taking any medications. She denies any sinus pressure/pain. She does report a intermittent headache when she is coughing. She denies SOB, Cp, n/v/d. She denies wheezing.     Review of Systems   Constitutional: Positive for fever. Negative for appetite change, chills, diaphoresis, fatigue and unexpected weight change.   HENT: Negative for congestion, ear pain, facial swelling, hearing loss, postnasal drip, rhinorrhea, sinus pressure, sinus pain, sneezing, sore throat, tinnitus and trouble swallowing.    Eyes: Negative for pain, redness, itching and visual disturbance.   Respiratory: Positive for cough. Negative for chest tightness, shortness of breath and wheezing.    Cardiovascular: Negative for chest pain, palpitations and leg swelling.   Gastrointestinal: Negative for abdominal distention, abdominal pain, blood in stool, constipation, diarrhea, nausea and vomiting.   Endocrine: Negative for polydipsia, polyphagia and polyuria.   Genitourinary: Negative for difficulty urinating, dysuria, frequency and urgency.   Musculoskeletal: Positive for arthralgias. Negative for gait problem and myalgias.   Skin: Negative for color change and rash.   Neurological: Positive for headaches. Negative for dizziness, syncope, speech difficulty, weakness and light-headedness.   Psychiatric/Behavioral: Positive for sleep disturbance (due to cough). Negative for behavioral problems, confusion, decreased concentration, hallucinations and suicidal ideas. The patient is not nervous/anxious.          Reviewed family, medical, surgical, and social history.    Objective:      BP  "125/75 (BP Location: Left arm, Patient Position: Sitting, BP Method: Medium (Automatic))   Pulse 101   Temp 99.1 °F (37.3 °C) (Tympanic)   Resp 18   Ht 5' 2" (1.575 m)   Wt 84.4 kg (186 lb)   SpO2 98%   BMI 34.02 kg/m²   Physical Exam   Constitutional: She is oriented to person, place, and time. She appears well-developed and well-nourished. She is active and cooperative. No distress.   HENT:   Head: Normocephalic and atraumatic.   Right Ear: Hearing, tympanic membrane, external ear and ear canal normal. No drainage. No decreased hearing is noted.   Left Ear: Hearing, tympanic membrane, external ear and ear canal normal. No drainage. No decreased hearing is noted.   Nose: Nose normal. No mucosal edema, rhinorrhea or nasal deformity. No epistaxis.   Mouth/Throat: Uvula is midline, oropharynx is clear and moist and mucous membranes are normal. No uvula swelling. No oropharyngeal exudate or posterior oropharyngeal erythema. No tonsillar exudate.   Eyes: Pupils are equal, round, and reactive to light. Conjunctivae, EOM and lids are normal. Right eye exhibits no discharge. Left eye exhibits no discharge.   Neck: Trachea normal and full passive range of motion without pain. No JVD present. No tracheal tenderness and no muscular tenderness present. Carotid bruit is not present. No edema and no erythema present. No thyroid mass and no thyromegaly present.   Cardiovascular: Normal rate, regular rhythm, S1 normal, S2 normal, normal heart sounds, intact distal pulses and normal pulses.   Pulmonary/Chest: Effort normal and breath sounds normal. No stridor. No tachypnea and no bradypnea. No respiratory distress. She has no decreased breath sounds. She has no wheezes. She has no rhonchi. She has no rales.   Abdominal: Soft. Normal appearance and bowel sounds are normal. She exhibits no distension and no abdominal bruit. There is no tenderness. There is no guarding and no CVA tenderness.   Musculoskeletal: She exhibits no " edema, tenderness or deformity.        Right foot: There is normal range of motion.        Left foot: There is normal range of motion.   Lymphadenopathy:     She has no cervical adenopathy.   Neurological: She is alert and oriented to person, place, and time. She has normal strength. She exhibits normal muscle tone.   Skin: Skin is warm, dry and intact. Capillary refill takes less than 2 seconds. No abrasion, no laceration, no lesion and no rash noted. She is not diaphoretic. No cyanosis. Nails show no clubbing.   Psychiatric: She has a normal mood and affect. Her speech is normal and behavior is normal. Judgment and thought content normal. Cognition and memory are normal.       Assessment:       1. Upper respiratory tract infection, unspecified type    2. Bronchitis        Plan:       Upper respiratory tract infection, unspecified type  -     dexamethasone injection 8 mg  -     POCT Influenza A/B    Bronchitis  -     benzonatate (TESSALON) 100 MG capsule; Take 1 capsule (100 mg total) by mouth 3 (three) times daily as needed for Cough.  Dispense: 30 capsule; Refill: 0          PLAN:  - Discussed with patient the plan of care   - Medications reviewed. Medication side effects discussed. Patient has no questions or concerns at this time. Informed patient to notify me regarding any concerns.   - Decadron 8mg IM x1  - tylenol/ibuprofren PRN q6hrs   - rest encouraged  - encouraged local honey and garlic in foods   - tessalon perles for cough TID  - Hydration encouraged  - Return to work Monday   - Negative flu   - Informed patient to please notify me with any questions or concerns at anytime  - Follow up ordered PRN        Risks, benefits, and side effects were discussed with the patient. All questions were answered to the fullest satisfaction of the patient, and pt verbalized understanding and agreement to treatment plan. Pt was to call with any new or worsening symptoms, or present to the ER.

## 2019-04-11 NOTE — LETTER
April 11, 2019      [facility]  [address]  [place] [address]  Phone: [phone]  Fax: [phone]       Patient: Krisyt Hernandez   YOB: 1975  Date of Visit: 04/11/2019    To Whom It May Concern:    Neida Hernandez  was at [facility] on 04/11/2019. She may return to work on 04/15/2019 with no restrictions. If you have any questions or concerns, or if I can be of further assistance, please do not hesitate to contact me.    Sincerely,    Aminata Martinez, NP

## 2019-04-23 ENCOUNTER — OFFICE VISIT (OUTPATIENT)
Dept: PLASTIC SURGERY | Facility: CLINIC | Age: 44
End: 2019-04-23
Payer: COMMERCIAL

## 2019-04-23 VITALS — HEIGHT: 62 IN | WEIGHT: 186.06 LBS | BODY MASS INDEX: 34.24 KG/M2

## 2019-04-23 DIAGNOSIS — Z51.89 AFTERCARE: Primary | ICD-10-CM

## 2019-04-23 PROCEDURE — 99024 POSTOP FOLLOW-UP VISIT: CPT | Mod: S$GLB,,, | Performed by: PLASTIC SURGERY

## 2019-04-23 PROCEDURE — 99024 PR POST-OP FOLLOW-UP VISIT: ICD-10-PCS | Mod: S$GLB,,, | Performed by: PLASTIC SURGERY

## 2019-04-23 NOTE — PROGRESS NOTES
"POST-OPERATIVE EXAM    CC  No chief complaint on file.    PROCEDURE  Breast reduction    SUBJECTIVE  Preoperative symptoms have improved.    PHYSICAL EXAM  Ht 5' 2" (1.575 m)   Wt 84.4 kg (186 lb 1.1 oz)   BMI 34.03 kg/m²   Breast symmetry and shape good  Healing primarily  Scars pink, without hypertrophy  No masses  Nipples sensate  NAC healthy, viable    ASSESSMENT  Encounter Diagnoses   Name Primary?    Aftercare Yes     No signs of complications.  She is doing satisfactorily after breast reduction. Preoperative symptoms are improved.  Allow time for contour and scar maturation.    PLAN  Care/instructions were reviewed, written handout given (see AVS).  Continue sport bra/surgical bra  No upper body exercise/heavy lifting x 1 month  Ok to shower  F/u in 3 weeks    Electronically signed by:  Ghanshyam Forte  4/23/2019  4:40 PM      "

## 2019-05-21 ENCOUNTER — PATIENT MESSAGE (OUTPATIENT)
Dept: PLASTIC SURGERY | Facility: CLINIC | Age: 44
End: 2019-05-21

## 2019-05-28 DIAGNOSIS — R20.2 NUMBNESS AND TINGLING SENSATION OF SKIN: Primary | ICD-10-CM

## 2019-05-28 DIAGNOSIS — R20.0 NUMBNESS AND TINGLING SENSATION OF SKIN: Primary | ICD-10-CM

## 2019-05-28 DIAGNOSIS — Z79.899 ENCOUNTER FOR LONG-TERM CURRENT USE OF MEDICATION: ICD-10-CM

## 2019-05-29 ENCOUNTER — LAB VISIT (OUTPATIENT)
Dept: LAB | Facility: HOSPITAL | Age: 44
End: 2019-05-29
Attending: FAMILY MEDICINE
Payer: COMMERCIAL

## 2019-05-29 DIAGNOSIS — R20.0 NUMBNESS AND TINGLING SENSATION OF SKIN: ICD-10-CM

## 2019-05-29 DIAGNOSIS — Z79.899 ENCOUNTER FOR LONG-TERM CURRENT USE OF MEDICATION: ICD-10-CM

## 2019-05-29 DIAGNOSIS — R20.2 NUMBNESS AND TINGLING SENSATION OF SKIN: ICD-10-CM

## 2019-05-29 LAB
ALBUMIN SERPL BCP-MCNC: 4.3 G/DL (ref 3.5–5.2)
ALP SERPL-CCNC: 74 U/L (ref 55–135)
ALT SERPL W/O P-5'-P-CCNC: 14 U/L (ref 10–44)
ANION GAP SERPL CALC-SCNC: 10 MMOL/L (ref 8–16)
AST SERPL-CCNC: 14 U/L (ref 10–40)
BASOPHILS # BLD AUTO: 0.08 K/UL (ref 0–0.2)
BASOPHILS NFR BLD: 0.8 % (ref 0–1.9)
BILIRUB SERPL-MCNC: 0.6 MG/DL (ref 0.1–1)
BUN SERPL-MCNC: 16 MG/DL (ref 6–20)
CALCIUM SERPL-MCNC: 9.5 MG/DL (ref 8.7–10.5)
CHLORIDE SERPL-SCNC: 105 MMOL/L (ref 95–110)
CO2 SERPL-SCNC: 26 MMOL/L (ref 23–29)
CREAT SERPL-MCNC: 0.8 MG/DL (ref 0.5–1.4)
DIFFERENTIAL METHOD: ABNORMAL
EOSINOPHIL # BLD AUTO: 0.7 K/UL (ref 0–0.5)
EOSINOPHIL NFR BLD: 6.6 % (ref 0–8)
ERYTHROCYTE [DISTWIDTH] IN BLOOD BY AUTOMATED COUNT: 14 % (ref 11.5–14.5)
EST. GFR  (AFRICAN AMERICAN): >60 ML/MIN/1.73 M^2
EST. GFR  (NON AFRICAN AMERICAN): >60 ML/MIN/1.73 M^2
GLUCOSE SERPL-MCNC: 120 MG/DL (ref 70–110)
HCT VFR BLD AUTO: 41.4 % (ref 37–48.5)
HGB BLD-MCNC: 13.3 G/DL (ref 12–16)
IMM GRANULOCYTES # BLD AUTO: 0.02 K/UL (ref 0–0.04)
IMM GRANULOCYTES NFR BLD AUTO: 0.2 % (ref 0–0.5)
LYMPHOCYTES # BLD AUTO: 2.7 K/UL (ref 1–4.8)
LYMPHOCYTES NFR BLD: 26.8 % (ref 18–48)
MAGNESIUM SERPL-MCNC: 2.1 MG/DL (ref 1.6–2.6)
MCH RBC QN AUTO: 26.8 PG (ref 27–31)
MCHC RBC AUTO-ENTMCNC: 32.1 G/DL (ref 32–36)
MCV RBC AUTO: 84 FL (ref 82–98)
MONOCYTES # BLD AUTO: 0.6 K/UL (ref 0.3–1)
MONOCYTES NFR BLD: 6 % (ref 4–15)
NEUTROPHILS # BLD AUTO: 5.9 K/UL (ref 1.8–7.7)
NEUTROPHILS NFR BLD: 59.6 % (ref 38–73)
NRBC BLD-RTO: 0 /100 WBC
PLATELET # BLD AUTO: 274 K/UL (ref 150–350)
PMV BLD AUTO: 10.4 FL (ref 9.2–12.9)
POTASSIUM SERPL-SCNC: 3.5 MMOL/L (ref 3.5–5.1)
PROT SERPL-MCNC: 7.9 G/DL (ref 6–8.4)
RBC # BLD AUTO: 4.96 M/UL (ref 4–5.4)
SODIUM SERPL-SCNC: 141 MMOL/L (ref 136–145)
TSH SERPL DL<=0.005 MIU/L-ACNC: 1.22 UIU/ML (ref 0.34–5.6)
WBC # BLD AUTO: 9.9 K/UL (ref 3.9–12.7)

## 2019-05-29 PROCEDURE — 82746 ASSAY OF FOLIC ACID SERUM: CPT

## 2019-05-29 PROCEDURE — 84443 ASSAY THYROID STIM HORMONE: CPT

## 2019-05-29 PROCEDURE — 36415 COLL VENOUS BLD VENIPUNCTURE: CPT

## 2019-05-29 PROCEDURE — 82607 VITAMIN B-12: CPT

## 2019-05-29 PROCEDURE — 80053 COMPREHEN METABOLIC PANEL: CPT

## 2019-05-29 PROCEDURE — 83735 ASSAY OF MAGNESIUM: CPT

## 2019-05-29 PROCEDURE — 85025 COMPLETE CBC W/AUTO DIFF WBC: CPT

## 2019-05-30 LAB
FOLATE SERPL-MCNC: 9.4 NG/ML (ref 4–24)
VIT B12 SERPL-MCNC: 249 PG/ML (ref 210–950)

## 2019-06-11 ENCOUNTER — OFFICE VISIT (OUTPATIENT)
Dept: PLASTIC SURGERY | Facility: CLINIC | Age: 44
End: 2019-06-11
Payer: COMMERCIAL

## 2019-06-11 VITALS
DIASTOLIC BLOOD PRESSURE: 74 MMHG | BODY MASS INDEX: 34.23 KG/M2 | WEIGHT: 186 LBS | SYSTOLIC BLOOD PRESSURE: 134 MMHG | HEART RATE: 83 BPM | HEIGHT: 62 IN

## 2019-06-11 DIAGNOSIS — Z51.89 AFTERCARE: Primary | ICD-10-CM

## 2019-06-11 PROCEDURE — 99024 POSTOP FOLLOW-UP VISIT: CPT | Mod: S$GLB,,, | Performed by: PLASTIC SURGERY

## 2019-06-11 PROCEDURE — 99024 PR POST-OP FOLLOW-UP VISIT: ICD-10-PCS | Mod: S$GLB,,, | Performed by: PLASTIC SURGERY

## 2019-06-11 NOTE — PROGRESS NOTES
"POST-OPERATIVE EXAM    CC  No chief complaint on file.    PROCEDURE  Bilateral breast reduction    SUBJECTIVE  Preoperative symptoms have improved, doing well, has some intermittent shooting pain pain in the right breast, stable not progressing    PHYSICAL EXAM  /74   Pulse 83   Ht 5' 2" (1.575 m)   Wt 84.4 kg (186 lb)   BMI 34.02 kg/m²   Breast symmetry and shape good  Healing primarily  Scars pink, without hypertrophy  No masses  Nipples sensate  NAC healthy, viable    ASSESSMENT  Encounter Diagnoses   Name Primary?    Aftercare Yes     No signs of complications.  She is doing satisfactorily after breast reduction. Preoperative symptoms are improved.  Allow time for contour and scar maturation.    PLAN  Care/instructions were reviewed, written handout given (see AVS).  Ok to return to regular bra and resume full activities  If pain worsens or progresses, she will notify us; suspect still some inflammation and scarring resulting in localized nerve pain that should improve with time  Photos completed 3 mo post-op  F/u at 6-9 mo    Electronically signed by:  Ghanshyam Forte  6/11/2019  5:10 PM      "

## 2019-07-01 RX ORDER — CLONAZEPAM 0.5 MG/1
0.5 TABLET ORAL 2 TIMES DAILY PRN
Qty: 30 TABLET | Refills: 0 | Status: SHIPPED | OUTPATIENT
Start: 2019-07-01 | End: 2022-06-13 | Stop reason: SDUPTHER

## 2019-08-23 RX ORDER — AMOXICILLIN 875 MG/1
875 TABLET, FILM COATED ORAL EVERY 12 HOURS
Qty: 20 TABLET | Refills: 0 | Status: SHIPPED | OUTPATIENT
Start: 2019-08-23 | End: 2019-09-14

## 2019-08-23 RX ORDER — AMOXICILLIN 875 MG/1
875 TABLET, FILM COATED ORAL EVERY 12 HOURS
Qty: 20 TABLET | Refills: 0 | Status: SHIPPED | OUTPATIENT
Start: 2019-08-23 | End: 2019-08-23

## 2019-09-11 ENCOUNTER — PATIENT MESSAGE (OUTPATIENT)
Dept: FAMILY MEDICINE | Facility: CLINIC | Age: 44
End: 2019-09-11

## 2019-09-11 RX ORDER — PHENTERMINE HYDROCHLORIDE 37.5 MG/1
37.5 TABLET ORAL
Qty: 30 TABLET | Refills: 0 | Status: SHIPPED | OUTPATIENT
Start: 2019-09-11 | End: 2020-06-08

## 2019-09-11 RX ORDER — VALACYCLOVIR HYDROCHLORIDE 1 G/1
1000 TABLET, FILM COATED ORAL DAILY
Qty: 90 TABLET | Refills: 3 | Status: SHIPPED | OUTPATIENT
Start: 2019-09-11 | End: 2019-12-23 | Stop reason: SDUPTHER

## 2019-09-14 ENCOUNTER — HOSPITAL ENCOUNTER (EMERGENCY)
Facility: HOSPITAL | Age: 44
Discharge: HOME | End: 2019-09-14
Attending: FAMILY MEDICINE
Payer: COMMERCIAL

## 2019-09-14 VITALS
SYSTOLIC BLOOD PRESSURE: 162 MMHG | HEART RATE: 101 BPM | DIASTOLIC BLOOD PRESSURE: 126 MMHG | HEIGHT: 62 IN | BODY MASS INDEX: 34.23 KG/M2 | WEIGHT: 186 LBS | TEMPERATURE: 98 F | OXYGEN SATURATION: 97 % | RESPIRATION RATE: 20 BRPM

## 2019-09-14 DIAGNOSIS — M79.604 PAIN OF RIGHT LOWER EXTREMITY: Primary | ICD-10-CM

## 2019-09-14 DIAGNOSIS — R52 PAIN: ICD-10-CM

## 2019-09-14 PROCEDURE — 25000003 PHARM REV CODE 250: Performed by: NURSE PRACTITIONER

## 2019-09-14 PROCEDURE — 99283 EMERGENCY DEPT VISIT LOW MDM: CPT | Mod: 25

## 2019-09-14 PROCEDURE — 73590 X-RAY EXAM OF LOWER LEG: CPT | Mod: 26,RT,, | Performed by: RADIOLOGY

## 2019-09-14 PROCEDURE — 73590 XR TIBIA FIBULA 2 VIEW RIGHT: ICD-10-PCS | Mod: 26,RT,, | Performed by: RADIOLOGY

## 2019-09-14 PROCEDURE — 73590 X-RAY EXAM OF LOWER LEG: CPT | Mod: TC,FY,RT

## 2019-09-14 RX ORDER — TRAMADOL HYDROCHLORIDE 50 MG/1
50 TABLET ORAL EVERY 8 HOURS PRN
Qty: 9 TABLET | Refills: 0 | Status: SHIPPED | OUTPATIENT
Start: 2019-09-14 | End: 2019-10-28

## 2019-09-14 RX ORDER — HYDROCODONE BITARTRATE AND ACETAMINOPHEN 10; 325 MG/1; MG/1
1 TABLET ORAL
Status: COMPLETED | OUTPATIENT
Start: 2019-09-14 | End: 2019-09-14

## 2019-09-14 RX ADMIN — HYDROCODONE BITARTRATE AND ACETAMINOPHEN 1 TABLET: 10; 325 TABLET ORAL at 08:09

## 2019-09-14 NOTE — ED TRIAGE NOTES
Patient reports she was on a bench in the kitchen reaching for something and stepped down wrong, complaining of right lower leg pain.

## 2019-09-15 NOTE — ED PROVIDER NOTES
CHIEF COMPLAINT  Chief Complaint   Patient presents with    Leg Pain     Patient reports she was on a bench in the kitchen reaching for something and stepped down wrong, complaining of right lower leg pain.       HPI  Kristy Blackwood a 44 y.o. female who presents to the ED with complaints of leg pain. States she stepped down hard off a step and started having severe pain in her right lower leg.   Has not taken anything for her symptoms. Happened earlier today    CURRENT MEDICATIONS  No current facility-administered medications on file prior to encounter.      Current Outpatient Medications on File Prior to Encounter   Medication Sig Dispense Refill    clonazePAM (KLONOPIN) 0.5 MG tablet Take 1 tablet (0.5 mg total) by mouth 2 (two) times daily as needed for Anxiety. 30 tablet 0    HYDROcodone-acetaminophen (NORCO)  mg per tablet Take 1 tablet by mouth every 4 (four) hours as needed for Pain. 30 tablet 0    phentermine (ADIPEX-P) 37.5 mg tablet Take 1 tablet (37.5 mg total) by mouth before breakfast. 30 tablet 0    valACYclovir (VALTREX) 1000 MG tablet Take 1 tablet (1,000 mg total) by mouth once daily. 90 tablet 3    vortioxetine (TRINTELLIX) 20 mg Tab Take 1 tablet (20 mg total) by mouth once daily. 90 tablet 3       ALLERGIES  Review of patient's allergies indicates:  No Known Allergies    Immunization History   Administered Date(s) Administered    Influenza - Quadrivalent - PF (6 months and older) 2018       PAST MEDICAL HISTORY  Past Medical History:   Diagnosis Date    Anxiety     Chronic back pain     Depression     Herpes     Obesity        SURGICAL HISTORY  Past Surgical History:   Procedure Laterality Date    BREAST SURGERY      reduction     SECTION      dedeborah      susie MARTIN wrist    ENDOMETRIAL ABLATION      HYSTERECTOMY      medial branch nerve block       RADIOFREQUENCY ABLATION OF LUMBAR MEDIAL BRANCH NERVE AT SINGLE LEVEL      REDUCTION OF BOTH  BREASTS Bilateral 3/13/2019    Procedure: MAMMOPLASTY, REDUCTION, BILATERAL;  Surgeon: Ghanshyam Forte MD;  Location: [facility];  Service: Plastics;  Laterality: Bilateral;    TONSILLECTOMY      TUBAL LIGATION         SOCIAL HISTORY  Social History     Socioeconomic History    Marital status:      Spouse name: Not on file    Number of children: Not on file    Years of education: Not on file    Highest education level: Not on file   Occupational History    Not on file   Social Needs    Financial resource strain: Not on file    Food insecurity:     Worry: Not on file     Inability: Not on file    Transportation needs:     Medical: Not on file     Non-medical: Not on file   Tobacco Use    Smoking status: Former Smoker     Last attempt to quit: 2006     Years since quittin.7    Smokeless tobacco: Never Used   Substance and Sexual Activity    Alcohol use: Yes     Comment: socially    Drug use: Never    Sexual activity: Yes   Lifestyle    Physical activity:     Days per week: Not on file     Minutes per session: Not on file    Stress: Not on file   Relationships    Social connections:     Talks on phone: Not on file     Gets together: Not on file     Attends Bahai service: Not on file     Active member of club or organization: Not on file     Attends meetings of clubs or organizations: Not on file     Relationship status: Not on file   Other Topics Concern    Not on file   Social History Narrative    Not on file       FAMILY HISTORY  Family History   Problem Relation Age of Onset    Pancreatic cancer Father     Breast cancer Paternal Aunt     Breast cancer Paternal Grandmother     Breast cancer Paternal Cousin        REVIEW OF SYSTEMS  Constitutional: No fever, chills, or weakness.  Eyes: No redness, pain, or discharge  HENT: No ear pain, no headache, no rhinorrhea, no throat pain  Respiratory: No cough, wheezing or shortness of breath  Cardiovascular: No chest pain, palpitations or  edema  GI: No abdominal pain, nausea, vomiting or diarrhea  Gu: No dysuria, no hematuria, or discharge  Musculoskeletal: right lower leg pain, full range of motion. Good sensation  Skin: No rash or abrasion  Neurologic: No focal weakness or sensory changes.  All systems otherwise negative except as noted in the Review of Systems and History of Present Illness      PHYSICAL EXAM  Reviewed Triage Note  VITAL SIGNS:   No data found.  Constitutional: Well developed, well nourished, Alert and oriented x3, No acute distress, non-toxic appearance.  HENT: Normocephalic, Atraumatic, Bilateral external ears normal, external nose negative, oropharynx moist, No oral exudates.  Eyes: PERRL, EOMI, Conjunctiva normal, No discharge.  Neck: Normal range of motion, no tenderness, supple, no carotid bruits  Respiratory: Normal breath sounds, no respiratory distress, no wheezing, no rhonchi, no rales  Cardiovascular: Normal heart rate, normal rhythm, no murmurs, no rubs, no gallops.  Gi: Bowel sounds normal, soft, no tenderness, non-distended, no masses, no pulsatile masses.  Musculoskeletal: Ambulatory with limp, No edema, slight tenderness to palpation, no cyanosis, no clubbing. Good range of motion in all major joints. No tenderness to palpation or major deformities noted.   Integument: Warm, Dry, No erythema, no rash  Neurologic: Normal motor function, normal sensory function. No focal deficits noted. Intact distal pulses  Psychiatric: Affect normal, judgment normal, mood normal      LABS  Pertinent labs reviewed. (see chart for details)  Labs Reviewed - No data to display    RADIOLOGY  X-Ray Tibia Fibula 2 View Right   Final Result            PROCEDURE  Procedures      ED COURSE & MEDICAL DECISION MAKING     MDM       Physical exam findings discussed with patient. No acute emergent medical condition identified at this time to warrant further testing. Will dispo home with instructions to follow up with PCP , return to the ED for  worsening condition. Pt agrees with plan of care.     DISPOSITION  Patient discharged in stable condition 9/14/2019  9:37 PM      CLINICAL IMPRESSION:  The primary encounter diagnosis was Pain of right lower extremity. A diagnosis of Pain was also pertinent to this visit.    Patient advised to follow-up with your PCP within 3 days for BP re-check if Blood Pressure was >120/80 without history of hypertension.    .     Jacqueline Dukes, KURT  09/28/19 1100

## 2019-09-16 ENCOUNTER — OFFICE VISIT (OUTPATIENT)
Dept: FAMILY MEDICINE | Facility: CLINIC | Age: 44
End: 2019-09-16
Payer: COMMERCIAL

## 2019-09-16 VITALS
WEIGHT: 185 LBS | OXYGEN SATURATION: 97 % | BODY MASS INDEX: 34.04 KG/M2 | HEART RATE: 81 BPM | HEIGHT: 62 IN | RESPIRATION RATE: 18 BRPM | SYSTOLIC BLOOD PRESSURE: 122 MMHG | DIASTOLIC BLOOD PRESSURE: 74 MMHG

## 2019-09-16 DIAGNOSIS — S89.90XA INJURY OF CALF: Primary | ICD-10-CM

## 2019-09-16 PROCEDURE — 99214 OFFICE O/P EST MOD 30 MIN: CPT | Mod: S$GLB,,, | Performed by: FAMILY MEDICINE

## 2019-09-16 PROCEDURE — 99214 PR OFFICE/OUTPT VISIT, EST, LEVL IV, 30-39 MIN: ICD-10-PCS | Mod: S$GLB,,, | Performed by: FAMILY MEDICINE

## 2019-09-16 PROCEDURE — 99999 PR PBB SHADOW E&M-EST. PATIENT-LVL III: CPT | Mod: PBBFAC,,, | Performed by: FAMILY MEDICINE

## 2019-09-16 PROCEDURE — 99999 PR PBB SHADOW E&M-EST. PATIENT-LVL III: ICD-10-PCS | Mod: PBBFAC,,, | Performed by: FAMILY MEDICINE

## 2019-09-16 PROCEDURE — 3008F BODY MASS INDEX DOCD: CPT | Mod: S$GLB,,, | Performed by: FAMILY MEDICINE

## 2019-09-16 PROCEDURE — 3008F PR BODY MASS INDEX (BMI) DOCUMENTED: ICD-10-PCS | Mod: S$GLB,,, | Performed by: FAMILY MEDICINE

## 2019-09-16 RX ORDER — KETOROLAC TROMETHAMINE 10 MG/1
10 TABLET, FILM COATED ORAL EVERY 6 HOURS PRN
Qty: 20 TABLET | Refills: 0 | Status: SHIPPED | OUTPATIENT
Start: 2019-09-16 | End: 2020-06-08

## 2019-09-16 RX ORDER — KETOROLAC TROMETHAMINE 30 MG/ML
60 INJECTION, SOLUTION INTRAMUSCULAR; INTRAVENOUS
Status: DISCONTINUED | OUTPATIENT
Start: 2019-09-16 | End: 2020-07-23 | Stop reason: HOSPADM

## 2019-09-16 RX ORDER — METHOCARBAMOL 500 MG/1
1000 TABLET, FILM COATED ORAL 3 TIMES DAILY
Qty: 100 TABLET | Refills: 0 | Status: SHIPPED | OUTPATIENT
Start: 2019-09-16 | End: 2022-06-13

## 2019-09-16 NOTE — PROGRESS NOTES
"Subjective:       Patient ID: Kristy Hernandez is a 44 y.o. female.    Chief Complaint: Follow-up (Pt refused vaccinations) and Leg Pain (R leg pain x2 days)    Follow up ER    Went to ER for calf pain  Step down injury  + swelling  Able to bear weight now  No sob  No fever  Xray non-diagnostic    Review of Systems   Constitutional: Negative for activity change, appetite change, chills, fatigue and fever.   HENT: Negative for congestion, dental problem, facial swelling, nosebleeds, postnasal drip, sinus pain, sore throat, trouble swallowing and voice change.    Eyes: Negative for pain, discharge and visual disturbance.   Respiratory: Negative for apnea, cough, chest tightness and shortness of breath.    Cardiovascular: Negative for chest pain and palpitations.   Gastrointestinal: Negative for abdominal pain, blood in stool, constipation and nausea.   Endocrine: Negative for cold intolerance, polydipsia and polyuria.   Genitourinary: Negative for difficulty urinating, enuresis and flank pain.   Musculoskeletal: Positive for arthralgias, back pain, gait problem and myalgias.   Skin: Negative for color change.   Allergic/Immunologic: Negative for environmental allergies and immunocompromised state.   Neurological: Negative for dizziness and light-headedness.   Hematological: Negative for adenopathy.   Psychiatric/Behavioral: Negative for agitation, behavioral problems, decreased concentration and dysphoric mood. The patient is not nervous/anxious.    All other systems reviewed and are negative.        Reviewed family, medical, surgical, and social history.    Objective:      /74 (BP Location: Left arm, Patient Position: Sitting, BP Method: Medium (Automatic))   Pulse 81   Resp 18   Ht 5' 2" (1.575 m)   Wt 83.9 kg (185 lb)   SpO2 97%   BMI 33.84 kg/m²   Physical Exam   Constitutional: She is oriented to person, place, and time. She appears well-developed and well-nourished. No distress.   HENT:   Head: " Normocephalic and atraumatic.   Nose: Nose normal.   Mouth/Throat: Oropharynx is clear and moist. No oropharyngeal exudate.   Eyes: Pupils are equal, round, and reactive to light. Conjunctivae and EOM are normal. No scleral icterus.   Neck: Normal range of motion. Neck supple. No thyromegaly present.   Cardiovascular: Normal rate, regular rhythm and normal heart sounds. Exam reveals no gallop and no friction rub.   No murmur heard.  Pulmonary/Chest: Effort normal and breath sounds normal. No respiratory distress. She has no wheezes. She has no rales. She exhibits no tenderness.   Abdominal: Soft. Bowel sounds are normal. She exhibits no distension. There is no tenderness. There is no guarding.   Musculoskeletal: Normal range of motion. She exhibits tenderness and deformity. She exhibits no edema.   Lymphadenopathy:     She has no cervical adenopathy.   Neurological: She is alert and oriented to person, place, and time. She displays normal reflexes. No cranial nerve deficit or sensory deficit. She exhibits normal muscle tone.   Skin: Skin is warm and dry. No rash noted. She is not diaphoretic. No erythema. No pallor.   Psychiatric: She has a normal mood and affect. Her behavior is normal. Judgment and thought content normal.   Nursing note and vitals reviewed.      Assessment:       1. Injury of calf        Plan:       Injury of calf  -     CT Leg (Tibia-Fibula) Without Contrast Right; Future; Expected date: 09/16/2019  -     methocarbamol (ROBAXIN) 500 MG Tab; Take 2 tablets (1,000 mg total) by mouth 3 (three) times daily.  Dispense: 100 tablet; Refill: 0  -     ketorolac injection 60 mg  -     ketorolac (TORADOL) 10 mg tablet; Take 1 tablet (10 mg total) by mouth every 6 (six) hours as needed for Pain.  Dispense: 20 tablet; Refill: 0    xray non-diagnostic  Significant swelling and ttp  Will prescribe meds as shown  Obtain Ct to rule fracture/tendon injury        Risks, benefits, and side effects were discussed  with the patient. All questions were answered to the fullest satisfaction of the patient, and pt verbalized understanding and agreement to treatment plan. Pt was to call with any new or worsening symptoms, or present to the ER.

## 2019-09-17 ENCOUNTER — HOSPITAL ENCOUNTER (OUTPATIENT)
Dept: RADIOLOGY | Facility: HOSPITAL | Age: 44
Discharge: HOME | End: 2019-09-17
Attending: FAMILY MEDICINE
Payer: COMMERCIAL

## 2019-09-17 DIAGNOSIS — S89.90XA INJURY OF CALF: ICD-10-CM

## 2019-09-17 PROCEDURE — 73700 CT LOWER EXTREMITY W/O DYE: CPT | Mod: TC,PN,RT

## 2019-09-17 PROCEDURE — 73700 CT LEG (TIBIA-FIBULA) WITHOUT CONTRAST RIGHT: ICD-10-PCS | Mod: 26,RT,S$GLB, | Performed by: RADIOLOGY

## 2019-09-17 PROCEDURE — 73700 CT LOWER EXTREMITY W/O DYE: CPT | Mod: 26,RT,S$GLB, | Performed by: RADIOLOGY

## 2019-09-23 ENCOUNTER — TELEPHONE (OUTPATIENT)
Dept: FAMILY MEDICINE | Facility: CLINIC | Age: 44
End: 2019-09-23

## 2019-09-23 ENCOUNTER — HOSPITAL ENCOUNTER (OUTPATIENT)
Dept: RADIOLOGY | Facility: HOSPITAL | Age: 44
Discharge: HOME | End: 2019-09-23
Attending: FAMILY MEDICINE
Payer: COMMERCIAL

## 2019-09-23 DIAGNOSIS — R60.9 EDEMA, UNSPECIFIED TYPE: Primary | ICD-10-CM

## 2019-09-23 DIAGNOSIS — R60.9 EDEMA, UNSPECIFIED TYPE: ICD-10-CM

## 2019-09-23 PROCEDURE — 93971 EXTREMITY STUDY: CPT | Mod: 26,RT,, | Performed by: RADIOLOGY

## 2019-09-23 PROCEDURE — 93971 EXTREMITY STUDY: CPT | Mod: TC,PN,RT

## 2019-09-23 PROCEDURE — 93971 US LOWER EXTREMITY VEINS RIGHT: ICD-10-PCS | Mod: 26,RT,, | Performed by: RADIOLOGY

## 2019-09-23 NOTE — TELEPHONE ENCOUNTER
Confirmed with [facility] in  that they di receive the rx and have the Xarelto in stock to be filled today.

## 2019-09-23 NOTE — TELEPHONE ENCOUNTER
Requesting orders for a doppler to right lower ext.  States US found a possible clot.  Please advise, thank you.

## 2019-09-28 PROBLEM — G89.29 CHRONIC LOW BACK PAIN: Status: ACTIVE | Noted: 2019-09-28

## 2019-09-28 PROBLEM — F32.A DEPRESSIVE DISORDER: Status: ACTIVE | Noted: 2019-09-28

## 2019-09-28 PROBLEM — M54.50 CHRONIC LOW BACK PAIN: Status: ACTIVE | Noted: 2019-09-28

## 2019-10-28 ENCOUNTER — HOSPITAL ENCOUNTER (EMERGENCY)
Facility: HOSPITAL | Age: 44
Discharge: HOME | End: 2019-10-28
Attending: EMERGENCY MEDICINE
Payer: COMMERCIAL

## 2019-10-28 VITALS
OXYGEN SATURATION: 100 % | TEMPERATURE: 99 F | BODY MASS INDEX: 34.04 KG/M2 | HEART RATE: 66 BPM | HEIGHT: 62 IN | DIASTOLIC BLOOD PRESSURE: 80 MMHG | SYSTOLIC BLOOD PRESSURE: 136 MMHG | WEIGHT: 185 LBS | RESPIRATION RATE: 19 BRPM

## 2019-10-28 DIAGNOSIS — R42 DIZZINESS: ICD-10-CM

## 2019-10-28 DIAGNOSIS — F41.9 ANXIETY: ICD-10-CM

## 2019-10-28 DIAGNOSIS — R53.83 FATIGUE, UNSPECIFIED TYPE: Primary | ICD-10-CM

## 2019-10-28 DIAGNOSIS — R07.9 CHEST PAIN: ICD-10-CM

## 2019-10-28 LAB
ALBUMIN SERPL BCP-MCNC: 4.2 G/DL (ref 3.5–5.2)
ALP SERPL-CCNC: 70 U/L (ref 55–135)
ALT SERPL W/O P-5'-P-CCNC: 19 U/L (ref 10–44)
ANION GAP SERPL CALC-SCNC: 12 MMOL/L (ref 8–16)
AST SERPL-CCNC: 23 U/L (ref 10–40)
BASOPHILS # BLD AUTO: 0.06 K/UL (ref 0–0.2)
BASOPHILS NFR BLD: 0.6 % (ref 0–1.9)
BILIRUB SERPL-MCNC: 0.7 MG/DL (ref 0.1–1)
BUN SERPL-MCNC: 16 MG/DL (ref 6–20)
CALCIUM SERPL-MCNC: 9.1 MG/DL (ref 8.7–10.5)
CHLORIDE SERPL-SCNC: 107 MMOL/L (ref 95–110)
CO2 SERPL-SCNC: 20 MMOL/L (ref 23–29)
CREAT SERPL-MCNC: 0.9 MG/DL (ref 0.5–1.4)
DIFFERENTIAL METHOD: NORMAL
EOSINOPHIL # BLD AUTO: 0.3 K/UL (ref 0–0.5)
EOSINOPHIL NFR BLD: 3 % (ref 0–8)
ERYTHROCYTE [DISTWIDTH] IN BLOOD BY AUTOMATED COUNT: 14 % (ref 11.5–14.5)
EST. GFR  (AFRICAN AMERICAN): >60 ML/MIN/1.73 M^2
EST. GFR  (NON AFRICAN AMERICAN): >60 ML/MIN/1.73 M^2
GLUCOSE SERPL-MCNC: 149 MG/DL (ref 70–110)
HCT VFR BLD AUTO: 38.3 % (ref 37–48.5)
HGB BLD-MCNC: 12.5 G/DL (ref 12–16)
IMM GRANULOCYTES # BLD AUTO: 0.04 K/UL (ref 0–0.04)
IMM GRANULOCYTES NFR BLD AUTO: 0.4 % (ref 0–0.5)
LYMPHOCYTES # BLD AUTO: 3.1 K/UL (ref 1–4.8)
LYMPHOCYTES NFR BLD: 31.6 % (ref 18–48)
MCH RBC QN AUTO: 27.8 PG (ref 27–31)
MCHC RBC AUTO-ENTMCNC: 32.6 G/DL (ref 32–36)
MCV RBC AUTO: 85 FL (ref 82–98)
MONOCYTES # BLD AUTO: 0.6 K/UL (ref 0.3–1)
MONOCYTES NFR BLD: 6.6 % (ref 4–15)
NEUTROPHILS # BLD AUTO: 5.6 K/UL (ref 1.8–7.7)
NEUTROPHILS NFR BLD: 57.8 % (ref 38–73)
NRBC BLD-RTO: 0 /100 WBC
PLATELET # BLD AUTO: 268 K/UL (ref 150–350)
PMV BLD AUTO: 9.6 FL (ref 9.2–12.9)
POTASSIUM SERPL-SCNC: 3.4 MMOL/L (ref 3.5–5.1)
PROT SERPL-MCNC: 7.2 G/DL (ref 6–8.4)
RBC # BLD AUTO: 4.5 M/UL (ref 4–5.4)
SODIUM SERPL-SCNC: 139 MMOL/L (ref 136–145)
TROPONIN I SERPL DL<=0.01 NG/ML-MCNC: <0.01 NG/ML (ref 0.02–0.5)
WBC # BLD AUTO: 9.74 K/UL (ref 3.9–12.7)

## 2019-10-28 PROCEDURE — 84484 ASSAY OF TROPONIN QUANT: CPT

## 2019-10-28 PROCEDURE — 85025 COMPLETE CBC W/AUTO DIFF WBC: CPT

## 2019-10-28 PROCEDURE — 93010 ELECTROCARDIOGRAM REPORT: CPT | Mod: ,,, | Performed by: INTERNAL MEDICINE

## 2019-10-28 PROCEDURE — 93010 EKG 12-LEAD: ICD-10-PCS | Mod: ,,, | Performed by: INTERNAL MEDICINE

## 2019-10-28 PROCEDURE — 80053 COMPREHEN METABOLIC PANEL: CPT

## 2019-10-28 PROCEDURE — 99284 EMERGENCY DEPT VISIT MOD MDM: CPT | Mod: 25

## 2019-10-28 PROCEDURE — 93005 ELECTROCARDIOGRAM TRACING: CPT

## 2019-10-28 PROCEDURE — 63600175 PHARM REV CODE 636 W HCPCS: Performed by: EMERGENCY MEDICINE

## 2019-10-28 RX ADMIN — SODIUM CHLORIDE 500 ML: 0.9 INJECTION, SOLUTION INTRAVENOUS at 10:10

## 2019-10-29 NOTE — ED NOTES
"Patient reports having achy, intermittent midsternal chest pain beginning at approximately 1200 today. Patient acknowledges several psychosocial stressers in her life at this time, such as her  passing away 4 months ago. She states she was diagnosed with a DVT in her right calf and began xarelto recently. She also began training for "couch to 5K" yesterday and asks if this may have caused her symptoms. Reports feeling like she was going to pass out earlier today.  "

## 2019-10-31 NOTE — ED PROVIDER NOTES
"Encounter Date: 10/28/2019       History     Chief Complaint   Patient presents with    Chest Pain    Fatigue     45yo female with pmh anxiety/depression, chronic back pain, HSV, and obesity presents to ED for evaluation of intermittent, aching, non-radiating midsternal chest pain beginning at approximately 1200 today. Patient acknowledges several psychosocial stressers in her life at this time, such as her  passing away 4 months ago, being diagnosed with a DVT in her right calf - began xarelto recently, and resuming training for "couch to 5K" yesterday. Additionally, she reports fatigue and believes this may have caused her symptoms. Reports feeling like she was going to pass out earlier today. Denies diaphoresis, edema, palpitations.         Review of patient's allergies indicates:  No Known Allergies  Past Medical History:   Diagnosis Date    Anxiety     Chronic back pain     Depression     Herpes     Obesity      Past Surgical History:   Procedure Laterality Date    BREAST SURGERY      reduction     SECTION      deque      susie R wrist    ENDOMETRIAL ABLATION      HYSTERECTOMY      medial branch nerve block       RADIOFREQUENCY ABLATION OF LUMBAR MEDIAL BRANCH NERVE AT SINGLE LEVEL      REDUCTION OF BOTH BREASTS Bilateral 3/13/2019    Procedure: MAMMOPLASTY, REDUCTION, BILATERAL;  Surgeon: Ghanshyam Forte MD;  Location: [facility];  Service: Plastics;  Laterality: Bilateral;    TONSILLECTOMY      TUBAL LIGATION       Family History   Problem Relation Age of Onset    Pancreatic cancer Father     Breast cancer Paternal Aunt     Breast cancer Paternal Grandmother     Breast cancer Paternal Cousin      Social History     Tobacco Use    Smoking status: Former Smoker     Last attempt to quit: 2006     Years since quittin.8    Smokeless tobacco: Never Used   Substance Use Topics    Alcohol use: Yes     Comment: socially    Drug use: Never     Review of Systems "   Constitutional: Positive for fatigue. Negative for appetite change, chills, diaphoresis and fever.   HENT: Negative for congestion, ear pain, rhinorrhea, sinus pressure, sinus pain, sore throat and tinnitus.    Eyes: Negative for photophobia and visual disturbance.   Respiratory: Negative for cough, chest tightness, shortness of breath and wheezing.    Cardiovascular: Positive for chest pain. Negative for palpitations and leg swelling.   Gastrointestinal: Negative for abdominal pain, constipation, diarrhea, nausea and vomiting.   Endocrine: Negative for cold intolerance, heat intolerance, polydipsia, polyphagia and polyuria.   Genitourinary: Negative for decreased urine volume, difficulty urinating, dysuria, flank pain, frequency, hematuria and urgency.   Musculoskeletal: Negative for arthralgias, back pain, gait problem, joint swelling, myalgias, neck pain and neck stiffness.   Skin: Negative for color change, pallor, rash and wound.   Allergic/Immunologic: Negative for immunocompromised state.   Neurological: Negative for dizziness, syncope, weakness, light-headedness, numbness and headaches.   Hematological: Negative for adenopathy. Does not bruise/bleed easily.   Psychiatric/Behavioral: Negative for decreased concentration, dysphoric mood and sleep disturbance. The patient is nervous/anxious.    All other systems reviewed and are negative.      Physical Exam     Initial Vitals   BP Pulse Resp Temp SpO2   10/28/19 2155 10/28/19 2155 10/28/19 2155 10/28/19 2203 10/28/19 2155   (!) 137/91 83 16 98.7 °F (37.1 °C) 98 %      MAP       --                Physical Exam    Nursing note and vitals reviewed.  Constitutional: She appears well-developed and well-nourished. She is not diaphoretic. No distress.   HENT:   Head: Normocephalic and atraumatic.   Right Ear: External ear normal.   Left Ear: External ear normal.   Nose: Nose normal.   Mouth/Throat: Oropharynx is clear and moist.   Eyes: Conjunctivae are normal.  Pupils are equal, round, and reactive to light. No scleral icterus.   Neck: Normal range of motion. Neck supple. No JVD present.   Cardiovascular: Normal rate, regular rhythm, normal heart sounds and intact distal pulses.   Pulmonary/Chest: Breath sounds normal. No respiratory distress. She has no wheezes. She has no rhonchi. She has no rales. She exhibits no tenderness.   Abdominal: Soft. Bowel sounds are normal. She exhibits no distension. There is no tenderness. There is no rebound and no guarding.   Musculoskeletal: Normal range of motion. She exhibits no edema or tenderness.   Lymphadenopathy:     She has no cervical adenopathy.   Neurological: She is alert and oriented to person, place, and time. GCS score is 15. GCS eye subscore is 4. GCS verbal subscore is 5. GCS motor subscore is 6.   Skin: Skin is warm and dry. Capillary refill takes less than 2 seconds. No rash noted. No erythema. No pallor.   Psychiatric: Her speech is normal. Judgment and thought content normal. Her mood appears anxious. She is withdrawn. Cognition and memory are normal.         ED Course   Procedures  Labs Reviewed   COMPREHENSIVE METABOLIC PANEL - Abnormal; Notable for the following components:       Result Value    Potassium 3.4 (*)     CO2 20 (*)     Glucose 149 (*)     All other components within normal limits   TROPONIN I - Abnormal; Notable for the following components:    Troponin I <0.01 (*)     All other components within normal limits   CBC W/ AUTO DIFFERENTIAL     EKG Readings: (Independently Interpreted)   Initial Reading: No STEMI. Rhythm: Normal Sinus Rhythm. Heart Rate: 85. Ectopy: No Ectopy. Conduction: Normal. ST Segments: Normal ST Segments. T Waves: Normal. Axis: Normal. Clinical Impression: Normal Sinus Rhythm     ECG Results          EKG 12-lead (Final result)  Result time 10/29/19 08:09:01    Final result by Interface, Lab In [facility] (10/29/19 08:09:01)                 Narrative:    Test Reason : R07.9,    Vent.  Rate : 085 BPM     Atrial Rate : 085 BPM     P-R Int : 170 ms          QRS Dur : 074 ms      QT Int : 384 ms       P-R-T Axes : 048 030 038 degrees     QTc Int : 456 ms    Normal sinus rhythm with sinus arrhythmia  Low voltage QRS  Borderline Abnormal ECG  When compared with ECG of 08-MAR-2019 13:30,  No significant change was found  Confirmed by Carlos Logan MD ([id number]) on 10/29/2019 8:08:50 AM    Referred By: [email or web address]   SELF           Confirmed By:Carlos Logan MD                            Imaging Results    None          Medical Decision Making:   Differential Diagnosis:   Anxiety, stress induced disorder, anemia, acute myocardial infarction, pneumonia, electrolyte imbalance  ED Management:  Reassurance provided, pt will follow up with pcp.                       Clinical Impression:       ICD-10-CM ICD-9-CM   1. Fatigue, unspecified type R53.83 780.79   2. Chest pain R07.9 786.50   3. Dizziness R42 780.4   4. Anxiety F41.9 300.00         Disposition:   Disposition: Discharged  Condition: Stable                        Shruthi Haines MD  10/30/19 2017

## 2019-12-12 ENCOUNTER — TELEPHONE (OUTPATIENT)
Dept: FAMILY MEDICINE | Facility: CLINIC | Age: 44
End: 2019-12-12

## 2019-12-12 DIAGNOSIS — I82.461 ACUTE DEEP VEIN THROMBOSIS (DVT) OF CALF MUSCLE VEIN OF RIGHT LOWER EXTREMITY: Primary | ICD-10-CM

## 2019-12-18 ENCOUNTER — HOSPITAL ENCOUNTER (OUTPATIENT)
Dept: RADIOLOGY | Facility: HOSPITAL | Age: 44
Discharge: HOME | End: 2019-12-18
Attending: FAMILY MEDICINE
Payer: COMMERCIAL

## 2019-12-18 ENCOUNTER — TELEPHONE (OUTPATIENT)
Dept: FAMILY MEDICINE | Facility: CLINIC | Age: 44
End: 2019-12-18

## 2019-12-18 DIAGNOSIS — I82.461 ACUTE DEEP VEIN THROMBOSIS (DVT) OF CALF MUSCLE VEIN OF RIGHT LOWER EXTREMITY: ICD-10-CM

## 2019-12-18 DIAGNOSIS — I82.461 ACUTE DEEP VEIN THROMBOSIS (DVT) OF CALF MUSCLE VEIN OF RIGHT LOWER EXTREMITY: Primary | ICD-10-CM

## 2019-12-18 PROCEDURE — 93971 EXTREMITY STUDY: CPT | Mod: 26,RT,, | Performed by: RADIOLOGY

## 2019-12-18 PROCEDURE — 93971 US LOWER EXTREMITY VEINS RIGHT: ICD-10-PCS | Mod: 26,RT,, | Performed by: RADIOLOGY

## 2019-12-18 PROCEDURE — 93971 EXTREMITY STUDY: CPT | Mod: TC,PN,RT

## 2019-12-18 NOTE — TELEPHONE ENCOUNTER
Pt is requesting lab orders as pended after taking her son to see hematologist.   Please advise, thank you.

## 2019-12-23 ENCOUNTER — LAB VISIT (OUTPATIENT)
Dept: LAB | Facility: HOSPITAL | Age: 44
End: 2019-12-23
Attending: FAMILY MEDICINE
Payer: COMMERCIAL

## 2019-12-23 DIAGNOSIS — I82.461 ACUTE DEEP VEIN THROMBOSIS (DVT) OF CALF MUSCLE VEIN OF RIGHT LOWER EXTREMITY: ICD-10-CM

## 2019-12-23 PROCEDURE — 85300 ANTITHROMBIN III ACTIVITY: CPT

## 2019-12-23 PROCEDURE — 36415 COLL VENOUS BLD VENIPUNCTURE: CPT

## 2019-12-23 PROCEDURE — 81241 F5 GENE: CPT

## 2019-12-23 PROCEDURE — 85306 CLOT INHIBIT PROT S FREE: CPT

## 2019-12-23 PROCEDURE — 85303 CLOT INHIBIT PROT C ACTIVITY: CPT

## 2019-12-23 PROCEDURE — 81240 F2 GENE: CPT

## 2019-12-23 PROCEDURE — 85305 CLOT INHIBIT PROT S TOTAL: CPT

## 2019-12-23 RX ORDER — VALACYCLOVIR HYDROCHLORIDE 1 G/1
1000 TABLET, FILM COATED ORAL DAILY
Qty: 90 TABLET | Refills: 3 | Status: SHIPPED | OUTPATIENT
Start: 2019-12-23 | End: 2020-01-27 | Stop reason: SDUPTHER

## 2019-12-26 LAB — AT III ACT/NOR PPP CHRO: 105 % (ref 83–118)

## 2019-12-27 LAB
APTT PROTEIN C ACTIVATOR+FV DP/APTT PPP: 145 % (ref 70–140)
F2 GENE MUT ANL BLD/T: NORMAL
F5 P.R506Q BLD/T QL: NORMAL
PROT S AG ACT/NOR PPP IA: 114 % (ref 50–140)
PROT S FREE AG ACT/NOR PPP IA: 116 % (ref 50–147)

## 2020-01-27 ENCOUNTER — PATIENT MESSAGE (OUTPATIENT)
Dept: FAMILY MEDICINE | Facility: CLINIC | Age: 45
End: 2020-01-27

## 2020-01-27 RX ORDER — GABAPENTIN 300 MG/1
300 CAPSULE ORAL 3 TIMES DAILY
Qty: 21 CAPSULE | Refills: 0 | Status: SHIPPED | OUTPATIENT
Start: 2020-01-27 | End: 2022-06-13 | Stop reason: SDUPTHER

## 2020-01-27 RX ORDER — VALACYCLOVIR HYDROCHLORIDE 1 G/1
1000 TABLET, FILM COATED ORAL 3 TIMES DAILY
Qty: 21 TABLET | Refills: 0 | Status: SHIPPED | OUTPATIENT
Start: 2020-01-27 | End: 2020-02-17 | Stop reason: SDUPTHER

## 2020-02-17 RX ORDER — VALACYCLOVIR HYDROCHLORIDE 1 G/1
1000 TABLET, FILM COATED ORAL 3 TIMES DAILY
Qty: 90 TABLET | Refills: 3 | Status: SHIPPED | OUTPATIENT
Start: 2020-02-17 | End: 2021-03-29 | Stop reason: SDUPTHER

## 2020-02-18 ENCOUNTER — TELEPHONE (OUTPATIENT)
Dept: FAMILY MEDICINE | Facility: CLINIC | Age: 45
End: 2020-02-18

## 2020-02-19 NOTE — TELEPHONE ENCOUNTER
Pt's insurance does not cover Trintellix, pt is applying for financial assistance program and they require a from to be signed and a printed prescription. Paperwork is filled out, just needs to be signed by provider and faxed with prescription.

## 2020-02-19 NOTE — TELEPHONE ENCOUNTER
Pt notified of denial.  Pt states she will apply for financial assistance program thrught the rx .

## 2020-03-06 DIAGNOSIS — J06.9 URI, ACUTE: Primary | ICD-10-CM

## 2020-03-06 RX ORDER — AZITHROMYCIN 250 MG/1
TABLET, FILM COATED ORAL
Qty: 6 TABLET | Refills: 0 | Status: SHIPPED | OUTPATIENT
Start: 2020-03-06 | End: 2020-03-11

## 2020-04-01 ENCOUNTER — TELEPHONE (OUTPATIENT)
Dept: FAMILY MEDICINE | Facility: CLINIC | Age: 45
End: 2020-04-01

## 2020-04-01 NOTE — LETTER
April 1, 2020    Kristy Jacksonjoe  [address]  [place] [address]             [facility] [address]  [place] [address]  Phone: [phone]  Fax: [phone] To whom it may concern:    This are medicines that are currently prescribed to Ms. Stewartod:    Trintellix 20mg once daily for anxiety/depression    Valacyclovir 1gm once daily or up to three times daily for fever blister flare  PRN    Clonazepam 0.5mg one tab twice daily as needed for anxiety    Gabapentin 300mg one capsule 3 times daily for nerve pain related to fever blisters/shingles    Compound cream (acyclovir/ketoprofen/lidocaine/triamcinolone) apply to fever blister 1-3 times daily    If you have any questions or concerns, please don't hesitate to call.    Sincerely,            David Lopez MD

## 2020-04-01 NOTE — TELEPHONE ENCOUNTER
Pt requesting letter w/her current medication list please    These are my current medications:  DAILY MEDS:  Trintellix 20mg once daily for anxiety/depression  Valacyclovir 1gm once daily or up to three times daily for fever blister breakout  PRN (as needed):  Clonazepam 0.5mg one tab twice daily as needed for anxiety- prescribed after Rhoan passed away  Gabapentin 300mg one capsule 3 times daily for nerve pain related to fever blisters/shingles  Compound cream (acyclovir/ketoprofen/lidocaine/triamcinolone) apply to fever blister 1-3 times daily    Thank you

## 2020-04-21 DIAGNOSIS — Z01.84 ANTIBODY RESPONSE EXAMINATION: ICD-10-CM

## 2020-05-20 ENCOUNTER — PATIENT MESSAGE (OUTPATIENT)
Dept: ADMINISTRATIVE | Facility: HOSPITAL | Age: 45
End: 2020-05-20

## 2020-05-21 DIAGNOSIS — Z01.84 ANTIBODY RESPONSE EXAMINATION: ICD-10-CM

## 2020-05-29 ENCOUNTER — TELEPHONE (OUTPATIENT)
Dept: FAMILY MEDICINE | Facility: CLINIC | Age: 45
End: 2020-05-29

## 2020-05-29 DIAGNOSIS — R07.89 OTHER CHEST PAIN: Primary | ICD-10-CM

## 2020-06-03 ENCOUNTER — HOSPITAL ENCOUNTER (OUTPATIENT)
Dept: CARDIOLOGY | Facility: HOSPITAL | Age: 45
Discharge: HOME | End: 2020-06-03
Attending: FAMILY MEDICINE
Payer: COMMERCIAL

## 2020-06-03 VITALS
HEIGHT: 62 IN | HEART RATE: 101 BPM | BODY MASS INDEX: 34.04 KG/M2 | WEIGHT: 185 LBS | DIASTOLIC BLOOD PRESSURE: 69 MMHG | SYSTOLIC BLOOD PRESSURE: 142 MMHG

## 2020-06-03 DIAGNOSIS — R07.89 OTHER CHEST PAIN: ICD-10-CM

## 2020-06-03 LAB
AORTIC ROOT ANNULUS: 2.64 CM
AORTIC VALVE CUSP SEPERATION: 1.71 CM
BSA FOR ECHO PROCEDURE: 1.92 M2
CV ECHO LV RWT: 0.51 CM
CV STRESS BASE HR: 77 BPM
DIASTOLIC BLOOD PRESSURE: 83 MMHG
DOP CALC LVOT AREA: 3 CM2
DOP CALC LVOT DIAMETER: 1.95 CM
ECHO LV POSTERIOR WALL: 1.13 CM (ref 0.6–1.1)
FRACTIONAL SHORTENING: 37 % (ref 28–44)
INTERVENTRICULAR SEPTUM: 1.11 CM (ref 0.6–1.1)
LA MAJOR: 3.71 CM
LA MINOR: 2.92 CM
LEFT ATRIUM SIZE: 4.38 CM
LEFT INTERNAL DIMENSION IN SYSTOLE: 2.78 CM (ref 2.1–4)
LEFT VENTRICLE DIASTOLIC VOLUME INDEX: 47.49 ML/M2
LEFT VENTRICLE DIASTOLIC VOLUME: 87.81 ML
LEFT VENTRICLE MASS INDEX: 94 G/M2
LEFT VENTRICLE SYSTOLIC VOLUME INDEX: 15.8 ML/M2
LEFT VENTRICLE SYSTOLIC VOLUME: 29.15 ML
LEFT VENTRICULAR INTERNAL DIMENSION IN DIASTOLE: 4.4 CM (ref 3.5–6)
LEFT VENTRICULAR MASS: 173.3 G
OHS CV CPX 1 MINUTE RECOVERY HEART RATE: 131 BPM
OHS CV CPX 85 PERCENT MAX PREDICTED HEART RATE MALE: 141
OHS CV CPX ESTIMATED METS: 10
OHS CV CPX MAX PREDICTED HEART RATE: 166
OHS CV CPX PATIENT IS FEMALE: 1
OHS CV CPX PATIENT IS MALE: 0
OHS CV CPX PEAK DIASTOLIC BLOOD PRESSURE: 66 MMHG
OHS CV CPX PEAK HEAR RATE: 171 BPM
OHS CV CPX PEAK RATE PRESSURE PRODUCT: ABNORMAL
OHS CV CPX PEAK SYSTOLIC BLOOD PRESSURE: 165 MMHG
OHS CV CPX PERCENT MAX PREDICTED HEART RATE ACHIEVED: 103
OHS CV CPX RATE PRESSURE PRODUCT PRESENTING: ABNORMAL
RA MAJOR: 3.68 CM
RA WIDTH: 1.7 CM
RIGHT VENTRICULAR END-DIASTOLIC DIMENSION: 2.92 CM
STRESS ECHO POST EXERCISE DUR MIN: 7 MINUTES
STRESS ECHO POST EXERCISE DUR SEC: 0 SECONDS
SYSTOLIC BLOOD PRESSURE: 138 MMHG

## 2020-06-03 PROCEDURE — 93351 STRESS TTE COMPLETE: CPT

## 2020-06-03 PROCEDURE — 93351 STRESS TTE COMPLETE: CPT | Mod: 26,,, | Performed by: INTERNAL MEDICINE

## 2020-06-03 PROCEDURE — 93351 STRESS ECHO (CUPID ONLY): ICD-10-PCS | Mod: 26,,, | Performed by: INTERNAL MEDICINE

## 2020-06-08 ENCOUNTER — OFFICE VISIT (OUTPATIENT)
Dept: FAMILY MEDICINE | Facility: CLINIC | Age: 45
End: 2020-06-08
Payer: COMMERCIAL

## 2020-06-08 VITALS
HEIGHT: 62 IN | HEART RATE: 82 BPM | OXYGEN SATURATION: 97 % | DIASTOLIC BLOOD PRESSURE: 85 MMHG | BODY MASS INDEX: 34.96 KG/M2 | WEIGHT: 190 LBS | SYSTOLIC BLOOD PRESSURE: 132 MMHG | TEMPERATURE: 99 F

## 2020-06-08 DIAGNOSIS — L02.31 LEFT BUTTOCK ABSCESS: Primary | ICD-10-CM

## 2020-06-08 PROCEDURE — 3008F BODY MASS INDEX DOCD: CPT | Mod: S$GLB,,, | Performed by: NURSE PRACTITIONER

## 2020-06-08 PROCEDURE — 99213 OFFICE O/P EST LOW 20 MIN: CPT | Mod: 25,S$GLB,, | Performed by: NURSE PRACTITIONER

## 2020-06-08 PROCEDURE — 3008F PR BODY MASS INDEX (BMI) DOCUMENTED: ICD-10-PCS | Mod: S$GLB,,, | Performed by: NURSE PRACTITIONER

## 2020-06-08 PROCEDURE — 99999 PR PBB SHADOW E&M-EST. PATIENT-LVL III: CPT | Mod: PBBFAC,,, | Performed by: NURSE PRACTITIONER

## 2020-06-08 PROCEDURE — 99213 PR OFFICE/OUTPT VISIT, EST, LEVL III, 20-29 MIN: ICD-10-PCS | Mod: 25,S$GLB,, | Performed by: NURSE PRACTITIONER

## 2020-06-08 PROCEDURE — 99999 PR PBB SHADOW E&M-EST. PATIENT-LVL III: ICD-10-PCS | Mod: PBBFAC,,, | Performed by: NURSE PRACTITIONER

## 2020-06-08 PROCEDURE — 10060 I&D ABSCESS SIMPLE/SINGLE: CPT | Mod: S$GLB,,, | Performed by: NURSE PRACTITIONER

## 2020-06-08 PROCEDURE — 10060 PR DRAIN SKIN ABSCESS SIMPLE: ICD-10-PCS | Mod: S$GLB,,, | Performed by: NURSE PRACTITIONER

## 2020-06-08 RX ORDER — CLINDAMYCIN HYDROCHLORIDE 300 MG/1
300 CAPSULE ORAL EVERY 8 HOURS
Qty: 30 CAPSULE | Refills: 0 | Status: SHIPPED | OUTPATIENT
Start: 2020-06-08 | End: 2020-06-10

## 2020-06-08 RX ORDER — MUPIROCIN 20 MG/G
OINTMENT TOPICAL 3 TIMES DAILY
Qty: 15 G | Refills: 0 | Status: ON HOLD | OUTPATIENT
Start: 2020-06-08 | End: 2020-06-11 | Stop reason: HOSPADM

## 2020-06-08 NOTE — PROGRESS NOTES
"Subjective:       Patient ID: Kristy Hernandez is a 44 y.o. female.    Chief Complaint: Recurrent Skin Infections    Ms. Kristy Hernandez is a 44 year old female who presents to the clinic today for boil on buttock. Reports it started a few days. Reports pain and very tender to touch. Reports she has been applying heat with no improvement. Reports she has been taking tyleno     Abscess   Chronicity:  NewProgression Since Onset: rapidly worsening  Abscess location: left buttock.  Associated Symptoms: no fever, no chills, no sweats  Characteristics: redness and swelling    Treatments Tried:  Warm compresses  Relieved by:  Nothing  Exacerbated by: sitting.    Review of Systems   Constitutional: Negative for activity change, chills, fatigue and fever.   Respiratory: Negative for cough, chest tightness, shortness of breath and wheezing.    Cardiovascular: Negative for chest pain and palpitations.   Gastrointestinal: Negative for abdominal pain, diarrhea, nausea and vomiting.   Musculoskeletal: Negative for arthralgias and myalgias.   Skin: Positive for color change.   Neurological: Negative for tremors, syncope, weakness and headaches.   Psychiatric/Behavioral: The patient is not nervous/anxious.          Reviewed family, medical, surgical, and social history.    Objective:      /85 (BP Location: Left arm, Patient Position: Sitting, BP Method: Large (Automatic))   Pulse 82   Temp 98.5 °F (36.9 °C) (Oral)   Ht 5' 2" (1.575 m)   Wt 86.2 kg (190 lb)   SpO2 97%   BMI 34.75 kg/m²   Physical Exam   Constitutional: She is oriented to person, place, and time. She appears well-developed.   HENT:   Head: Normocephalic.   Right Ear: External ear normal.   Left Ear: External ear normal.   Nose: Nose normal.   Mouth/Throat: Oropharynx is clear and moist.   Eyes: Pupils are equal, round, and reactive to light. Conjunctivae are normal.   Neck: Normal range of motion.   Cardiovascular: Normal rate, regular rhythm, normal " heart sounds and intact distal pulses.   No murmur heard.  Pulmonary/Chest: Effort normal and breath sounds normal. She has no wheezes.   Abdominal: Soft. Bowel sounds are normal. She exhibits no mass.   Musculoskeletal: Normal range of motion.   Neurological: She is alert and oriented to person, place, and time.   Skin: Skin is warm. Capillary refill takes less than 2 seconds. There is erythema.        Psychiatric: She has a normal mood and affect. Her behavior is normal. Judgment and thought content normal.       Assessment:       1. Left buttock abscess        Plan:       Left buttock abscess  -     clindamycin (CLEOCIN) 300 MG capsule; Take 1 capsule (300 mg total) by mouth every 8 (eight) hours. for 10 days  Dispense: 30 capsule; Refill: 0  -     mupirocin (BACTROBAN) 2 % ointment; Apply topically 3 (three) times daily.  Dispense: 15 g; Refill: 0          PLAN:  - Discussed with patient the plan of care  - Patient was informed of I&D. Abscess was cleaned. 1 mL of lidocaine was injected.   - Medications reviewed. Medication side effects discussed. Patient has no questions or concerns at this time. Informed patient to notify me regarding any concerns.   - Continue heat  - tylenol PRN 6 hours  - Informed patient to please notify me with any questions or concerns at anytime  - Follow up ordered for 2 weeks        Risks, benefits, and side effects were discussed with the patient. All questions were answered to the fullest satisfaction of the patient, and pt verbalized understanding and agreement to treatment plan. Pt was to call with any new or worsening symptoms, or present to the ER.

## 2020-06-10 ENCOUNTER — OFFICE VISIT (OUTPATIENT)
Dept: SURGERY | Facility: CLINIC | Age: 45
End: 2020-06-10
Payer: COMMERCIAL

## 2020-06-10 VITALS
BODY MASS INDEX: 37.04 KG/M2 | SYSTOLIC BLOOD PRESSURE: 153 MMHG | DIASTOLIC BLOOD PRESSURE: 91 MMHG | HEART RATE: 93 BPM | RESPIRATION RATE: 16 BRPM | TEMPERATURE: 98 F | HEIGHT: 62 IN | OXYGEN SATURATION: 98 % | WEIGHT: 201.31 LBS

## 2020-06-10 DIAGNOSIS — L02.31 LEFT BUTTOCK ABSCESS: Primary | ICD-10-CM

## 2020-06-10 DIAGNOSIS — L02.31 LEFT BUTTOCK ABSCESS: ICD-10-CM

## 2020-06-10 PROCEDURE — 99203 OFFICE O/P NEW LOW 30 MIN: CPT | Mod: S$GLB,,, | Performed by: SURGERY

## 2020-06-10 PROCEDURE — 99203 PR OFFICE/OUTPT VISIT, NEW, LEVL III, 30-44 MIN: ICD-10-PCS | Mod: S$GLB,,, | Performed by: SURGERY

## 2020-06-10 PROCEDURE — 3008F BODY MASS INDEX DOCD: CPT | Mod: S$GLB,,, | Performed by: SURGERY

## 2020-06-10 PROCEDURE — 3008F PR BODY MASS INDEX (BMI) DOCUMENTED: ICD-10-PCS | Mod: S$GLB,,, | Performed by: SURGERY

## 2020-06-10 RX ORDER — ONDANSETRON 4 MG/1
4 TABLET, FILM COATED ORAL EVERY 6 HOURS PRN
Qty: 30 TABLET | Refills: 0 | Status: SHIPPED | OUTPATIENT
Start: 2020-06-10 | End: 2022-06-13

## 2020-06-10 RX ORDER — OXYCODONE AND ACETAMINOPHEN 10; 325 MG/1; MG/1
1 TABLET ORAL EVERY 8 HOURS PRN
Qty: 20 TABLET | Refills: 0 | Status: SHIPPED | OUTPATIENT
Start: 2020-06-10 | End: 2020-10-09

## 2020-06-10 RX ORDER — SODIUM CHLORIDE, SODIUM LACTATE, POTASSIUM CHLORIDE, CALCIUM CHLORIDE 600; 310; 30; 20 MG/100ML; MG/100ML; MG/100ML; MG/100ML
INJECTION, SOLUTION INTRAVENOUS CONTINUOUS
Status: CANCELLED | OUTPATIENT
Start: 2020-06-10

## 2020-06-10 RX ORDER — CIPROFLOXACIN 500 MG/1
500 TABLET ORAL EVERY 12 HOURS
Qty: 10 TABLET | Refills: 0 | Status: SHIPPED | OUTPATIENT
Start: 2020-06-10 | End: 2020-07-22

## 2020-06-10 RX ORDER — LIDOCAINE HYDROCHLORIDE 10 MG/ML
1 INJECTION, SOLUTION EPIDURAL; INFILTRATION; INTRACAUDAL; PERINEURAL ONCE
Status: CANCELLED | OUTPATIENT
Start: 2020-06-10 | End: 2020-06-10

## 2020-06-10 NOTE — H&P
[facility]  General Surgery  H&P      Patient Name: Kristy Hernandez  MRN: [id number]      Patient ID: Kristy Hernandez     Chief Complaint:  I am having an abscess drained      HPI:  Ms. Hernandez presents today to proceed with surgical drainage of a buttock abscess.  She was seen in Surgery Clinic yesterday  as a consult from Aminata ELIZONDO.  She was sent in consultation for a painful mass in her left buttock.  She developed a painful red mass in the medial left buttock approximately 1 week ago.  Abscess I and D was attempted on 6/8/2020 with no purulent exudate encountered.  Pain, swelling, redness persist.  Treated with clindamycin, no benefit.  No fever or chills.  No recent trauma.  No triggering issues.  Pain is aggravated by pressure application to the area.  No alleviating factors.  No other associated symptoms.  Similar problem years ago in the exact same location required surgical intervention      Allergies & Meds:    No Known Allergies    Current Outpatient Medications   Medication Sig Dispense Refill    clonazePAM (KLONOPIN) 0.5 MG tablet Take 1 tablet (0.5 mg total) by mouth 2 (two) times daily as needed for Anxiety. 30 tablet 0    methocarbamol (ROBAXIN) 500 MG Tab Take 2 tablets (1,000 mg total) by mouth 3 (three) times daily. (Patient taking differently: Take 1,000 mg by mouth 3 (three) times daily as needed. ) 100 tablet 0    mupirocin (BACTROBAN) 2 % ointment Apply topically 3 (three) times daily. 15 g 0    valACYclovir (VALTREX) 1000 MG tablet Take 1 tablet (1,000 mg total) by mouth 3 (three) times daily. 90 tablet 3    vortioxetine (TRINTELLIX) 20 mg Tab Take 1 tablet (20 mg total) by mouth once daily. 90 tablet 3    ciprofloxacin HCl (CIPRO) 500 MG tablet Take 1 tablet (500 mg total) by mouth every 12 (twelve) hours. Take as directed until bottle is empty 10 tablet 0    gabapentin (NEURONTIN) 300 MG capsule Take 1 capsule (300 mg  total) by mouth 3 (three) times daily. for 7 days 21 capsule 0    ondansetron (ZOFRAN) 4 MG tablet Take 1 tablet (4 mg total) by mouth every 6 (six) hours as needed for Nausea. 30 tablet 0    oxyCODONE-acetaminophen (PERCOCET)  mg per tablet Take 1 tablet by mouth every 8 (eight) hours as needed for Pain. 20 tablet 0     Current Facility-Administered Medications   Medication Dose Route Frequency Provider Last Rate Last Dose    ketorolac injection 60 mg  60 mg Intramuscular 1 time in Clinic/HOD David Lopez MD           PMFSHx:  Past Medical History:   Diagnosis Date    Anxiety     Chronic back pain     Depression     Herpes     Obesity        Past Surgical History:   Procedure Laterality Date    BREAST SURGERY      reduction     SECTION      2009    dequervein R wrist    ENDOMETRIAL ABLATION      HYSTERECTOMY      medial branch nerve block       RADIOFREQUENCY ABLATION OF LUMBAR MEDIAL BRANCH NERVE AT SINGLE LEVEL      REDUCTION OF BOTH BREASTS Bilateral 3/13/2019    Procedure: MAMMOPLASTY, REDUCTION, BILATERAL;  Surgeon: Ghanshyam Forte MD;  Location: [facility];  Service: Plastics;  Laterality: Bilateral;    TONSILLECTOMY      TUBAL LIGATION         Family History   Problem Relation Age of Onset    Pancreatic cancer Father     Breast cancer Paternal Aunt     Breast cancer Paternal Grandmother     Breast cancer Paternal Cousin        Social History     Tobacco Use    Smoking status: Former Smoker     Last attempt to quit: 2006     Years since quittin.4    Smokeless tobacco: Never Used   Substance Use Topics    Alcohol use: Yes     Comment: socially    Drug use: Never       Review of Systems   Constitutional: Negative for appetite change, chills, fatigue, fever and unexpected weight change.   HENT: Negative for congestion, dental problem, ear pain, mouth sores, postnasal drip, rhinorrhea, sore throat, tinnitus, trouble swallowing and voice change.    Eyes:  Negative for photophobia, pain, discharge and visual disturbance.   Respiratory: Negative for cough, chest tightness, shortness of breath and wheezing.    Cardiovascular: Negative for chest pain, palpitations and leg swelling.   Gastrointestinal: Negative for abdominal pain, blood in stool, constipation, diarrhea, nausea and vomiting.   Endocrine: Negative for cold intolerance, heat intolerance, polydipsia, polyphagia and polyuria.   Genitourinary: Negative for difficulty urinating, dysuria, flank pain, frequency, hematuria and urgency.   Musculoskeletal: Negative for arthralgias, joint swelling and myalgias.   Skin: Negative for color change and rash.   Allergic/Immunologic: Negative for immunocompromised state.   Neurological: Negative for dizziness, tremors, seizures, syncope, speech difficulty, weakness, numbness and headaches.   Hematological: Negative for adenopathy. Does not bruise/bleed easily.   Psychiatric/Behavioral: Negative for agitation, confusion, hallucinations, self-injury and suicidal ideas. The patient is not nervous/anxious.             Physical Exam   Constitutional: She is oriented to person, place, and time. She appears well-developed and well-nourished. She is active.  Non-toxic appearance. No distress. Body mass index is 36.82 kg/m².   HENT: Head: Normocephalic and atraumatic.   Right Ear: Hearing and external ear normal. No drainage or tenderness.   Left Ear: Hearing and external ear normal. No drainage or tenderness.   Nose: Nose normal. No rhinorrhea. No epistaxis.   Mouth/Throat: Uvula is midline, oropharynx is clear and moist and mucous membranes are normal. Mucous membranes are not pale, not dry and not cyanotic. No oropharyngeal exudate.   Eyes: Pupils are equal, round, and reactive to light. Conjunctivae and lids are normal. Right eye exhibits no discharge and no exudate. Left eye exhibits no discharge and no exudate. Right conjunctiva is not injected. Right eye exhibits no  nystagmus. Left eye exhibits no nystagmus.   Neck: Trachea normal, full passive range of motion without pain and phonation normal. No JVD present. Carotid bruit is not present. No tracheal deviation present. No thyroid mass and no thyromegaly present.   Cardiovascular: Normal rate, regular rhythm, S1 normal, S2 normal, normal heart sounds and intact distal pulses. PMI is not displaced. Exam reveals no gallop and no friction rub.   No murmur heard.  Pulmonary/Chest: Effort normal and breath sounds normal. No accessory muscle usage. No respiratory distress. She exhibits no mass, no tenderness and no crepitus. Right breast exhibits no inverted nipple, no mass, no nipple discharge, no skin change and no tenderness. Left breast exhibits no inverted nipple, no mass, no nipple discharge, no skin change and no tenderness. Breasts are symmetrical.   Abdominal: Soft. Normal appearance and bowel sounds are normal. She exhibits no distension, no fluid wave, no abdominal bruit and no mass. There is no hepatosplenomegaly. There is no tenderness. There is no rebound, no guarding and negative Lobo's sign. No hernia. Hernia confirmed negative in the right inguinal area and confirmed negative in the left inguinal area.   Genitourinary: Rectum normal and vagina normal. There is no rash or lesion on the right labia. There is no rash or lesion on the left labia. Right adnexum displays no mass. Left adnexum displays no mass. No vaginal discharge found.   Musculoskeletal:        Cervical back: Normal.        Thoracic back: Normal.        Lumbar back: Normal.        Right upper arm: Normal.        Left upper arm: Normal.        Right forearm: Normal.        Left forearm: Normal.        Right hand: Normal.        Left hand: Normal.        Right upper leg: Normal.        Left upper leg: Normal.        Right lower leg: Normal.        Left lower leg: Normal.        Right foot: Normal.        Left foot: Normal.   Lymphadenopathy:        Head  (right side): No submental, no submandibular and no posterior auricular adenopathy present.        Head (left side): No submental, no submandibular and no posterior auricular adenopathy present.     She has no cervical adenopathy.     She has no axillary adenopathy.        Right: No inguinal and no supraclavicular adenopathy present.        Left: No inguinal and no supraclavicular adenopathy present.   Neurological: She is alert and oriented to person, place, and time. She has normal strength. No cranial nerve deficit or sensory deficit. Coordination and gait normal. GCS eye subscore is 4. GCS verbal subscore is 5. GCS motor subscore is 6.   Skin: Skin is warm, dry and intact. No rash noted. No cyanosis. Nails show no clubbing. 2 cm abscess medial left buttock   Psychiatric: She has a normal mood and affect. Her speech is normal. Judgment and thought content normal. Her mood appears not anxious. Her affect is not inappropriate. She is not actively hallucinating. She does not exhibit a depressed mood.             Assessment:         1. Left buttock abscess          Plan:     Left buttock abscess  -     Ambulatory referral/consult to General Surgery  -     Case Request Operating Room: INCISION AND DRAINAGE, ABSCESS    Other orders  -     ciprofloxacin HCl (CIPRO) 500 MG tablet; Take 1 tablet (500 mg total) by mouth every 12 (twelve) hours. Take as directed until bottle is empty  Dispense: 10 tablet; Refill: 0  -     oxyCODONE-acetaminophen (PERCOCET)  mg per tablet; Take 1 tablet by mouth every 8 (eight) hours as needed for Pain.  Dispense: 20 tablet; Refill: 0  -     ondansetron (ZOFRAN) 4 MG tablet; Take 1 tablet (4 mg total) by mouth every 6 (six) hours as needed for Nausea.  Dispense: 30 tablet; Refill: 0        Follow up around 6/24/2020 for routine postop evaluation.    Counseling/Medical Decision Making:  Kristy Hernandez was counseled regarding the results of the evaluation thus far and the differential  diagnosis.  In addition all options as well as the risks, benefits, possible complications, details of, and indications for each option were also discussed.  Diagnoses discussed included but were not limited to: cellulitis, folliculitis, abscess, carbuncle, fasciitis, hematoma, soft tissue neoplasms, etc. Options discussed included but were not limited to:  nonsurgical management, incision and drainage, second opinion, referral elsewhere for treatment, observation, etc.  Possible complications of incision and drainage discussed included but were not limited to: bleeding, infections, scars, chronic pain, nerve damage, disability, recurrence, need for additional operations or procedures, metastatic infections, etc. Questions solicited and answered.  Entire conversation was held in laymans terms.  I read the consent form to her verbatim.  All unfamiliar terms defined.  A copy of the consent form was provided for her review outside the office / hospital prior to the procedure.  At the conclusion of the conversation she voiced complete understanding of all we discussed, satisfaction that all questions were fully answered, and that she felt fully informed and completely capable of making an informed decision.  She desires and requests to proceed with an I&D

## 2020-06-10 NOTE — LETTER
Rosalina 10, 2020      Aminata Martinez, NP  [address] [facility] [place] [address]           [facility] - General Surgery  [address] [place] [address]  Phone: [phone]  Fax: [phone]          Patient: Kristy Hernandez   MR Number: [id number]   YOB: 1975   Date of Visit: 6/10/2020       Dear Aminata Martinez:    Thank you for referring Kristy Hernandez to me for evaluation. Attached you will find relevant portions of my assessment and plan of care.    If you have questions, please do not hesitate to call me. I look forward to following Kristy Hernandez along with you.    Sincerely,    Joshua Guillermo MD    Enclosure  CC:  No Recipients    If you would like to receive this communication electronically, please contact [email or web address] or [phone] to request more information on [facility] access.    For providers and/or their staff who would like to refer a patient to [facility], please contact us through our one-stop-shop provider referral line, [facility], at [phone].    If you feel you have received this communication in error or would no longer like to receive these types of communications, please e-mail [email or web address]

## 2020-06-10 NOTE — PROGRESS NOTES
Subjective:       Patient ID: Kristy Hernandez     Chief Complaint:  Consult (Referral-Juan-Left Buttock abcess)      HPI:  Ms. Hernandez presents today as a consult from Aminata ELIZONDO.  She was sent in consultation for a painful mass in her left buttock.  She developed a painful red mass in the medial left buttock approximately 1 week ago.  Abscess I and D was attempted on 6/8/2020 with no purulent exudate encountered.  Pain, swelling, redness persist.  Treated with clindamycin, no benefit.  No fever or chills.  No recent trauma.  No triggering issues.  Pain is aggravated by pressure application to the area.  No alleviating factors.  No other associated symptoms.  Similar problem years ago in the exact same location required surgical intervention      Allergies & Meds:  Review of patient's allergies indicates:  No Known Allergies    Current Outpatient Medications   Medication Sig Dispense Refill    clonazePAM (KLONOPIN) 0.5 MG tablet Take 1 tablet (0.5 mg total) by mouth 2 (two) times daily as needed for Anxiety. 30 tablet 0    methocarbamol (ROBAXIN) 500 MG Tab Take 2 tablets (1,000 mg total) by mouth 3 (three) times daily. (Patient taking differently: Take 1,000 mg by mouth 3 (three) times daily as needed. ) 100 tablet 0    mupirocin (BACTROBAN) 2 % ointment Apply topically 3 (three) times daily. 15 g 0    valACYclovir (VALTREX) 1000 MG tablet Take 1 tablet (1,000 mg total) by mouth 3 (three) times daily. 90 tablet 3    vortioxetine (TRINTELLIX) 20 mg Tab Take 1 tablet (20 mg total) by mouth once daily. 90 tablet 3    ciprofloxacin HCl (CIPRO) 500 MG tablet Take 1 tablet (500 mg total) by mouth every 12 (twelve) hours. Take as directed until bottle is empty 10 tablet 0    gabapentin (NEURONTIN) 300 MG capsule Take 1 capsule (300 mg total) by mouth 3 (three) times daily. for 7 days 21 capsule 0    ondansetron (ZOFRAN) 4 MG tablet Take 1 tablet (4 mg total) by mouth every 6 (six) hours as  needed for Nausea. 30 tablet 0    oxyCODONE-acetaminophen (PERCOCET)  mg per tablet Take 1 tablet by mouth every 8 (eight) hours as needed for Pain. 20 tablet 0     Current Facility-Administered Medications   Medication Dose Route Frequency Provider Last Rate Last Dose    ketorolac injection 60 mg  60 mg Intramuscular 1 time in Clinic/HOD David Lopez MD           PMFSHx:  Past Medical History:   Diagnosis Date    Anxiety     Chronic back pain     Depression     Herpes     Obesity        Past Surgical History:   Procedure Laterality Date    BREAST SURGERY      reduction     SECTION      deque      dequervein R wrist    ENDOMETRIAL ABLATION      HYSTERECTOMY      medial branch nerve block       RADIOFREQUENCY ABLATION OF LUMBAR MEDIAL BRANCH NERVE AT SINGLE LEVEL      REDUCTION OF BOTH BREASTS Bilateral 3/13/2019    Procedure: MAMMOPLASTY, REDUCTION, BILATERAL;  Surgeon: Ghanshyam Forte MD;  [facility];  Service: Plastics;  Laterality: Bilateral;    TONSILLECTOMY      TUBAL LIGATION         Family History   Problem Relation Age of Onset    Pancreatic cancer Father     Breast cancer Paternal Aunt     Breast cancer Paternal Grandmother     Breast cancer Paternal Cousin        Social History     Tobacco Use    Smoking status: Former Smoker     Last attempt to quit:      Years since quittin.4    Smokeless tobacco: Never Used   Substance Use Topics    Alcohol use: Yes     Comment: socially    Drug use: Never       Review of Systems   Constitutional: Negative for appetite change, chills, fatigue, fever and unexpected weight change.   HENT: Negative for congestion, dental problem, ear pain, mouth sores, postnasal drip, rhinorrhea, sore throat, tinnitus, trouble swallowing and voice change.    Eyes: Negative for photophobia, pain, discharge and visual disturbance.   Respiratory: Negative for cough, chest tightness, shortness of breath and wheezing.     Cardiovascular: Negative for chest pain, palpitations and leg swelling.   Gastrointestinal: Negative for abdominal pain, blood in stool, constipation, diarrhea, nausea and vomiting.   Endocrine: Negative for cold intolerance, heat intolerance, polydipsia, polyphagia and polyuria.   Genitourinary: Negative for difficulty urinating, dysuria, flank pain, frequency, hematuria and urgency.   Musculoskeletal: Negative for arthralgias, joint swelling and myalgias.   Skin: Negative for color change and rash.   Allergic/Immunologic: Negative for immunocompromised state.   Neurological: Negative for dizziness, tremors, seizures, syncope, speech difficulty, weakness, numbness and headaches.   Hematological: Negative for adenopathy. Does not bruise/bleed easily.   Psychiatric/Behavioral: Negative for agitation, confusion, hallucinations, self-injury and suicidal ideas. The patient is not nervous/anxious.             Physical Exam   Constitutional: She is oriented to person, place, and time. She appears well-developed and well-nourished. She is active.  Non-toxic appearance. No distress.   Body mass index is 36.82 kg/m².     HENT:   Head: Normocephalic and atraumatic.   Right Ear: Hearing and external ear normal. No drainage or tenderness.   Left Ear: Hearing and external ear normal. No drainage or tenderness.   Nose: Nose normal. No rhinorrhea. No epistaxis.   Mouth/Throat: Uvula is midline, oropharynx is clear and moist and mucous membranes are normal. Mucous membranes are not pale, not dry and not cyanotic. No oropharyngeal exudate.   Eyes: Pupils are equal, round, and reactive to light. Conjunctivae and lids are normal. Right eye exhibits no discharge and no exudate. Left eye exhibits no discharge and no exudate. Right conjunctiva is not injected. Right eye exhibits no nystagmus. Left eye exhibits no nystagmus.   Neck: Trachea normal, full passive range of motion without pain and phonation normal. No JVD present. Carotid  bruit is not present. No tracheal deviation present. No thyroid mass and no thyromegaly present.   Cardiovascular: Normal rate, regular rhythm, S1 normal, S2 normal, normal heart sounds and intact distal pulses. PMI is not displaced. Exam reveals no gallop and no friction rub.   No murmur heard.  Pulmonary/Chest: Effort normal and breath sounds normal. No accessory muscle usage. No respiratory distress. She exhibits no mass, no tenderness and no crepitus. Right breast exhibits no inverted nipple, no mass, no nipple discharge, no skin change and no tenderness. Left breast exhibits no inverted nipple, no mass, no nipple discharge, no skin change and no tenderness. Breasts are symmetrical.   Abdominal: Soft. Normal appearance and bowel sounds are normal. She exhibits no distension, no fluid wave, no abdominal bruit and no mass. There is no hepatosplenomegaly. There is no tenderness. There is no rebound, no guarding and negative Lobo's sign. No hernia. Hernia confirmed negative in the right inguinal area and confirmed negative in the left inguinal area.   Genitourinary: Rectum normal and vagina normal. There is no rash or lesion on the right labia. There is no rash or lesion on the left labia. Right adnexum displays no mass. Left adnexum displays no mass. No vaginal discharge found.   Musculoskeletal:        Cervical back: Normal.        Thoracic back: Normal.        Lumbar back: Normal.        Right upper arm: Normal.        Left upper arm: Normal.        Right forearm: Normal.        Left forearm: Normal.        Right hand: Normal.        Left hand: Normal.        Right upper leg: Normal.        Left upper leg: Normal.        Right lower leg: Normal.        Left lower leg: Normal.        Right foot: Normal.        Left foot: Normal.   Lymphadenopathy:        Head (right side): No submental, no submandibular and no posterior auricular adenopathy present.        Head (left side): No submental, no submandibular and no  posterior auricular adenopathy present.     She has no cervical adenopathy.     She has no axillary adenopathy.        Right: No inguinal and no supraclavicular adenopathy present.        Left: No inguinal and no supraclavicular adenopathy present.   Neurological: She is alert and oriented to person, place, and time. She has normal strength. No cranial nerve deficit or sensory deficit. Coordination and gait normal. GCS eye subscore is 4. GCS verbal subscore is 5. GCS motor subscore is 6.   Skin: Skin is warm, dry and intact. No rash noted. No cyanosis. Nails show no clubbing.   2 cm abscess medial left buttock   Psychiatric: She has a normal mood and affect. Her speech is normal. Judgment and thought content normal. Her mood appears not anxious. Her affect is not inappropriate. She is not actively hallucinating. She does not exhibit a depressed mood.             Assessment:         1. Left buttock abscess          Plan:     Left buttock abscess  -     Ambulatory referral/consult to General Surgery  -     Case Request Operating Room: INCISION AND DRAINAGE, ABSCESS    Other orders  -     ciprofloxacin HCl (CIPRO) 500 MG tablet; Take 1 tablet (500 mg total) by mouth every 12 (twelve) hours. Take as directed until bottle is empty  Dispense: 10 tablet; Refill: 0  -     oxyCODONE-acetaminophen (PERCOCET)  mg per tablet; Take 1 tablet by mouth every 8 (eight) hours as needed for Pain.  Dispense: 20 tablet; Refill: 0  -     ondansetron (ZOFRAN) 4 MG tablet; Take 1 tablet (4 mg total) by mouth every 6 (six) hours as needed for Nausea.  Dispense: 30 tablet; Refill: 0        Follow up in about 2 weeks (around 6/24/2020) for routine postop evaluation.    Counseling/Medical Decision Making:  Kristy Hernandez was counseled regarding the results of the evaluation thus far and the differential diagnosis.  In addition all options as well as the risks, benefits, possible complications, details of, and indications for each option  were also discussed.  Diagnoses discussed included but were not limited to: cellulitis, folliculitis, abscess, carbuncle, fasciitis, hematoma, soft tissue neoplasms, etc. Options discussed included but were not limited to:  nonsurgical management, incision and drainage, second opinion, referral elsewhere for treatment, observation, etc.  Possible complications of incision and drainage discussed included but were not limited to: bleeding, infections, scars, chronic pain, nerve damage, disability, recurrence, need for additional operations or procedures, metastatic infections, etc. Questions solicited and answered.  Entire conversation was held in laymans terms.  I read the consent form to her verbatim.  All unfamiliar terms defined.  A copy of the consent form was provided for her review outside the office / hospital prior to the procedure.  At the conclusion of the conversation she voiced complete understanding of all we discussed, satisfaction that all questions were fully answered, and that she felt fully informed and completely capable of making an informed decision.  She desires and requests to proceed with an I&D    Total face-to-face encounter time was 60 minutes, 40 minutes spent counseling as outlined/summarized above.    The above order for ambulatory referral/consult to General surgery is in the plan section of my note incorrectly.  This is the order from another provider that precipitated this office visit/encounter.  Currently there is an Epic software error that automatically pulls the referral diagnosis from the requesting provider into the plan section of this encounter note.  This order should be disregarded as part of this note/encounter.    Kristy Hernandez was instructed that the best way to communicate with providers, clinic, and hospital is throughn the [facility] Epic Patient Portal.  This will expedite care, communication, and permit functions such as reviewing results and scheduling  appointments.  This will also help prevent any communication issues that might otherwise occur. She was given instructions on how to get registered for portal access and how to log on to the portal.  I strongly encouraged her to obtain portal access. She voiced understanding.

## 2020-06-11 ENCOUNTER — ANESTHESIA (OUTPATIENT)
Dept: SURGERY | Facility: HOSPITAL | Age: 45
End: 2020-06-11
Payer: COMMERCIAL

## 2020-06-11 ENCOUNTER — HOSPITAL ENCOUNTER (OUTPATIENT)
Facility: HOSPITAL | Age: 45
Discharge: HOME | End: 2020-06-11
Attending: SURGERY | Admitting: SURGERY
Payer: COMMERCIAL

## 2020-06-11 ENCOUNTER — ANESTHESIA EVENT (OUTPATIENT)
Dept: SURGERY | Facility: HOSPITAL | Age: 45
End: 2020-06-11
Payer: COMMERCIAL

## 2020-06-11 DIAGNOSIS — L02.31 LEFT BUTTOCK ABSCESS: ICD-10-CM

## 2020-06-11 LAB — SARS-COV-2 RDRP RESP QL NAA+PROBE: NEGATIVE

## 2020-06-11 PROCEDURE — 37000008 HC ANESTHESIA 1ST 15 MINUTES: Performed by: SURGERY

## 2020-06-11 PROCEDURE — U0002 COVID-19 LAB TEST NON-CDC: HCPCS

## 2020-06-11 PROCEDURE — 36000705 HC OR TIME LEV I EA ADD 15 MIN: Performed by: SURGERY

## 2020-06-11 PROCEDURE — 36000704 HC OR TIME LEV I 1ST 15 MIN: Performed by: SURGERY

## 2020-06-11 PROCEDURE — 25000003 PHARM REV CODE 250: Performed by: NURSE ANESTHETIST, CERTIFIED REGISTERED

## 2020-06-11 PROCEDURE — 10061 I&D ABSCESS COMP/MULTIPLE: CPT | Mod: ,,, | Performed by: SURGERY

## 2020-06-11 PROCEDURE — 63600175 PHARM REV CODE 636 W HCPCS: Performed by: SURGERY

## 2020-06-11 PROCEDURE — 87070 CULTURE OTHR SPECIMN AEROBIC: CPT

## 2020-06-11 PROCEDURE — D9220A PRA ANESTHESIA: Mod: ,,, | Performed by: ANESTHESIOLOGY

## 2020-06-11 PROCEDURE — 37000009 HC ANESTHESIA EA ADD 15 MINS: Performed by: SURGERY

## 2020-06-11 PROCEDURE — 87075 CULTR BACTERIA EXCEPT BLOOD: CPT

## 2020-06-11 PROCEDURE — 63600175 PHARM REV CODE 636 W HCPCS: Performed by: NURSE ANESTHETIST, CERTIFIED REGISTERED

## 2020-06-11 PROCEDURE — D9220A PRA ANESTHESIA: ICD-10-PCS | Mod: ,,, | Performed by: ANESTHESIOLOGY

## 2020-06-11 PROCEDURE — 88304 TISSUE EXAM BY PATHOLOGIST: CPT | Performed by: PATHOLOGY

## 2020-06-11 PROCEDURE — 87076 CULTURE ANAEROBE IDENT EACH: CPT

## 2020-06-11 PROCEDURE — 10061 PR DRAIN SKIN ABSCESS COMPLIC: ICD-10-PCS | Mod: ,,, | Performed by: SURGERY

## 2020-06-11 PROCEDURE — 88305 TISSUE EXAM BY PATHOLOGIST: CPT | Mod: 26,,, | Performed by: PATHOLOGY

## 2020-06-11 PROCEDURE — 71000015 HC POSTOP RECOV 1ST HR: Performed by: SURGERY

## 2020-06-11 PROCEDURE — 71000033 HC RECOVERY, INTIAL HOUR: Performed by: SURGERY

## 2020-06-11 PROCEDURE — 88305 TISSUE EXAM BY PATHOLOGIST: ICD-10-PCS | Mod: 26,,, | Performed by: PATHOLOGY

## 2020-06-11 PROCEDURE — 63600175 PHARM REV CODE 636 W HCPCS: Performed by: ANESTHESIOLOGY

## 2020-06-11 RX ORDER — LIDOCAINE HYDROCHLORIDE 20 MG/ML
INJECTION, SOLUTION EPIDURAL; INFILTRATION; INTRACAUDAL; PERINEURAL
Status: DISCONTINUED | OUTPATIENT
Start: 2020-06-11 | End: 2020-06-11

## 2020-06-11 RX ORDER — LIDOCAINE HYDROCHLORIDE 10 MG/ML
1 INJECTION, SOLUTION EPIDURAL; INFILTRATION; INTRACAUDAL; PERINEURAL ONCE
Status: DISCONTINUED | OUTPATIENT
Start: 2020-06-11 | End: 2020-06-11 | Stop reason: HOSPADM

## 2020-06-11 RX ORDER — MIDAZOLAM HYDROCHLORIDE 1 MG/ML
INJECTION, SOLUTION INTRAMUSCULAR; INTRAVENOUS
Status: DISCONTINUED | OUTPATIENT
Start: 2020-06-11 | End: 2020-06-11

## 2020-06-11 RX ORDER — ROCURONIUM BROMIDE 10 MG/ML
INJECTION, SOLUTION INTRAVENOUS
Status: DISCONTINUED | OUTPATIENT
Start: 2020-06-11 | End: 2020-06-11

## 2020-06-11 RX ORDER — SUCCINYLCHOLINE CHLORIDE 20 MG/ML
INJECTION INTRAMUSCULAR; INTRAVENOUS
Status: DISCONTINUED | OUTPATIENT
Start: 2020-06-11 | End: 2020-06-11

## 2020-06-11 RX ORDER — ONDANSETRON 2 MG/ML
INJECTION INTRAMUSCULAR; INTRAVENOUS
Status: DISCONTINUED
Start: 2020-06-11 | End: 2020-06-11 | Stop reason: HOSPADM

## 2020-06-11 RX ORDER — PROPOFOL 10 MG/ML
VIAL (ML) INTRAVENOUS
Status: DISCONTINUED | OUTPATIENT
Start: 2020-06-11 | End: 2020-06-11

## 2020-06-11 RX ORDER — ONDANSETRON 2 MG/ML
4 INJECTION INTRAMUSCULAR; INTRAVENOUS ONCE
Status: COMPLETED | OUTPATIENT
Start: 2020-06-11 | End: 2020-06-11

## 2020-06-11 RX ORDER — MEPERIDINE HYDROCHLORIDE 50 MG/ML
INJECTION INTRAMUSCULAR; INTRAVENOUS; SUBCUTANEOUS
Status: DISCONTINUED | OUTPATIENT
Start: 2020-06-11 | End: 2020-06-11

## 2020-06-11 RX ORDER — CIPROFLOXACIN 2 MG/ML
400 INJECTION, SOLUTION INTRAVENOUS
Status: DISCONTINUED | OUTPATIENT
Start: 2020-06-11 | End: 2020-06-11 | Stop reason: HOSPADM

## 2020-06-11 RX ORDER — SODIUM CHLORIDE, SODIUM LACTATE, POTASSIUM CHLORIDE, CALCIUM CHLORIDE 600; 310; 30; 20 MG/100ML; MG/100ML; MG/100ML; MG/100ML
INJECTION, SOLUTION INTRAVENOUS CONTINUOUS
Status: DISCONTINUED | OUTPATIENT
Start: 2020-06-11 | End: 2020-06-11 | Stop reason: HOSPADM

## 2020-06-11 RX ORDER — ONDANSETRON 2 MG/ML
INJECTION INTRAMUSCULAR; INTRAVENOUS
Status: DISCONTINUED | OUTPATIENT
Start: 2020-06-11 | End: 2020-06-11

## 2020-06-11 RX ADMIN — MIDAZOLAM HYDROCHLORIDE 2 MG: 1 INJECTION, SOLUTION INTRAMUSCULAR; INTRAVENOUS at 03:06

## 2020-06-11 RX ADMIN — MEPERIDINE HYDROCHLORIDE 50 MG: 50 INJECTION INTRAMUSCULAR; INTRAVENOUS; SUBCUTANEOUS at 04:06

## 2020-06-11 RX ADMIN — SODIUM CHLORIDE, POTASSIUM CHLORIDE, SODIUM LACTATE AND CALCIUM CHLORIDE: 600; 310; 30; 20 INJECTION, SOLUTION INTRAVENOUS at 03:06

## 2020-06-11 RX ADMIN — LIDOCAINE HYDROCHLORIDE 100 MG: 20 INJECTION, SOLUTION EPIDURAL; INFILTRATION; INTRACAUDAL; PERINEURAL at 03:06

## 2020-06-11 RX ADMIN — ROCURONIUM BROMIDE 30 MG: 10 INJECTION, SOLUTION INTRAVENOUS at 03:06

## 2020-06-11 RX ADMIN — ONDANSETRON 4 MG: 2 INJECTION INTRAMUSCULAR; INTRAVENOUS at 03:06

## 2020-06-11 RX ADMIN — SUCCINYLCHOLINE CHLORIDE 150 MG: 20 INJECTION, SOLUTION INTRAMUSCULAR; INTRAVENOUS at 03:06

## 2020-06-11 RX ADMIN — PROPOFOL 200 MG: 10 INJECTION, EMULSION INTRAVENOUS at 03:06

## 2020-06-11 RX ADMIN — ONDANSETRON 4 MG: 2 INJECTION INTRAMUSCULAR; INTRAVENOUS at 05:06

## 2020-06-11 RX ADMIN — MEPERIDINE HYDROCHLORIDE 50 MG: 50 INJECTION INTRAMUSCULAR; INTRAVENOUS; SUBCUTANEOUS at 03:06

## 2020-06-11 NOTE — ANESTHESIA POSTPROCEDURE EVALUATION
Anesthesia Post Evaluation    Patient: Kristy Hernandez    Procedure(s) Performed: Procedure(s) (LRB):  INCISION AND DRAINAGE, ABSCESS (Left)    Final Anesthesia Type: general    Patient location during evaluation: PACU  Patient participation: Yes- Able to Participate  Level of consciousness: awake and awake and alert  Post-procedure vital signs: reviewed and stable  Pain management: adequate  Airway patency: patent    PONV status at discharge: No PONV  Anesthetic complications: no      Cardiovascular status: blood pressure returned to baseline  Respiratory status: unassisted and spontaneous ventilation  Hydration status: euvolemic  Follow-up not needed.          Vitals Value Taken Time   /88 6/11/2020  5:06 PM   Temp 37.1 °C (98.7 °F) 6/11/2020  2:54 PM   Pulse 82 6/11/2020  5:10 PM   Resp 26 6/11/2020  5:10 PM   SpO2 98 % 6/11/2020  5:10 PM   Vitals shown include unvalidated device data.      No case tracking events are documented in the log.      Pain/Delfin Score: Delfin Score: 10 (6/11/2020  4:55 PM)

## 2020-06-11 NOTE — OP NOTE
[facility] - Periop Services  General Surgery  Op Note  06/11/2020      Patient Name: Kristy Hernandez  MRN: [id number]         SURGEON:  Joshua Guillermo M.D.     ASSISTANT: None     PREOPERATIVE DIAGNOSIS:  Left buttock abscess     POSTOPERATIVE DIAGNOSIS:  Same.  Ruptured left buttock epidermal inclusion cyst     SURGICAL PROCEDURE PERFORMED:  Incision and drainage of left buttock abscess.  Excision of ruptured epidermal inclusion cyst.    ANESTHESIA:  General.     INDICATIONS FOR PROCEDURE:  Ms. Hernandez was seen in surgery clinic yesterday with a painful inflamed mass on the medial aspect of her left buttock consistent with an abscess.    SURGICAL FINDINGS:  There was a small epidermal inclusion cyst that had ruptured.  There was a small abscess in the subcutaneous space beneath the ruptured epidermal inclusion cyst.     PROCEDURE IN DETAIL:  In preop holding, Kristy Hernandez was identified by name, wrist band, and birth date.  She confirmed identity.  Informed consent process reviewed. She verbalized consent as well as understanding of all treatment options, risks, benefits, details of, and indications for each option.  Operative site was marked.  She confirmed the correct site was identified and marked.  Intravenous antibiotics administered.  Transported to operating room. Time-out completed.  General anesthesia induced without difficulty or complications while on the PACU stretcher.  Transferred to the OR bed in a prone position.  Breath sounds are auscultated bilaterally and confirmed excellent/symmetrical.  All pressure points, tension points, and bony prominences were padded and protected.  Operative field prepped and draped in the usual sterile fashion with ChloraPrep, sterile towels, and sterile drapes.  ChloraPrep was permitted to dry for 3 min before placing towels and drapes.  Elliptical incision was made surrounding the mass with a 15 blade.  Purulent a shows encountered and  cultured.  Skin was freed from the underlying subcutaneous tissue with electrocautery.  Abscess cavity was explored.  All purulent exudate was evacuated.  No necrotic tissue noted.  No tracking, loculations, extensions, etc.  Hemostasis ensured.  Wound irrigated.  Hemostasis ensured again.  Wound packed with saline soaked gauze.  Sterile dressing applied.  Wound measured 2 x 1 cm.    TOLERATED: Well    COUNTS:  Correct  on multiple occasions    DRAINS: None     ESTIMATED BLOOD LOSS:  Less than 2 cc     SPECIMEN:  Ellipse of skin with ruptured epidermal inclusion cyst.  Culture of purulent exudate.     COMPLICATIONS: None     DISPOSITION:  To PACU, stable.      Documented with M*Modal voice recognition software.      Joshua Guillermo MD FACS

## 2020-06-11 NOTE — ANESTHESIA PREPROCEDURE EVALUATION
06/11/2020  Kristy Hernandez is a 44 y.o., female.    Anesthesia Evaluation    I have reviewed the Patient Summary Reports.    I have reviewed the Nursing Notes.    I have reviewed the Medications.     Review of Systems  Anesthesia Hx:  No problems with previous Anesthesia  Neg history of prior surgery. Denies Family Hx of Anesthesia complications.   Denies Personal Hx of Anesthesia complications.   Social:  Non-Smoker    Hematology/Oncology:  Hematology Normal   Oncology Normal     EENT/Dental:EENT/Dental Normal   Cardiovascular:  Cardiovascular Normal     Pulmonary:  Pulmonary Normal    Renal/:  Renal/ Normal     Hepatic/GI:  Hepatic/GI Normal    Musculoskeletal:  Musculoskeletal Normal    Neurological:  Neurology Normal    Endocrine:  Endocrine Normal    Dermatological:  Skin Normal    Psych:   Psychiatric History depression          Physical Exam  General:  Obesity    Airway/Jaw/Neck:  Airway Findings: Mouth Opening: Normal Tongue: Normal  General Airway Assessment: Adult  Mallampati: II  TM Distance: 4 - 6 cm        Eyes/Ears/Nose:  EYES/EARS/NOSE FINDINGS: Normal   Dental:  DENTAL FINDINGS: Normal   Chest/Lungs:  Chest/Lungs Clear    Heart/Vascular:  Heart Findings: Normal Heart murmur: negative Vascular Findings: Normal    Abdomen:  Abdomen Findings: Normal    Musculoskeletal:  Musculoskeletal Findings: Normal   Skin:  Skin Findings: Normal    Mental Status:  Mental Status Findings: Normal        Anesthesia Plan  Type of Anesthesia, risks & benefits discussed:  Anesthesia Type:  general  Patient's Preference:   Intra-op Monitoring Plan: standard ASA monitors  Intra-op Monitoring Plan Comments:   Post Op Pain Control Plan:   Post Op Pain Control Plan Comments:   Induction:   IV  Beta Blocker:  Patient is not currently on a Beta-Blocker (No further documentation required).       Informed Consent:  Patient understands risks and agrees with Anesthesia plan.  Questions answered. Anesthesia consent signed with patient.  ASA Score: 3     Day of Surgery Review of History & Physical:    H&P update referred to the provider.         Ready For Surgery From Anesthesia Perspective.

## 2020-06-11 NOTE — H&P
[facility] - General Surgery  General Surgery  H&P  6/11/2020      Patient Name: Kristy Hernandez  MRN: [id number]      Patient ID: Kristy Hernandez     Chief Complaint:  I am having an abscess drained      HPI:  Ms. Hernandez presents today to proceed with surgical drainage of a buttock abscess.  She was seen in Surgery Clinic yesterday  as a consult from Aminata ELIZONDO.  She was sent in consultation for a painful mass in her left buttock.  She developed a painful red mass in the medial left buttock approximately 1 week ago.  Abscess I and D was attempted on 6/8/2020 with no purulent exudate encountered.  Pain, swelling, redness persist.  Treated with clindamycin, no benefit.  No fever or chills.  No recent trauma.  No triggering issues.  Pain is aggravated by pressure application to the area.  No alleviating factors.  No other associated symptoms.  Similar problem years ago in the exact same location required surgical intervention      Allergies & Meds:    No Known Allergies    Current Outpatient Medications   Medication Sig Dispense Refill    clonazePAM (KLONOPIN) 0.5 MG tablet Take 1 tablet (0.5 mg total) by mouth 2 (two) times daily as needed for Anxiety. 30 tablet 0    methocarbamol (ROBAXIN) 500 MG Tab Take 2 tablets (1,000 mg total) by mouth 3 (three) times daily. (Patient taking differently: Take 1,000 mg by mouth 3 (three) times daily as needed. ) 100 tablet 0    mupirocin (BACTROBAN) 2 % ointment Apply topically 3 (three) times daily. 15 g 0    valACYclovir (VALTREX) 1000 MG tablet Take 1 tablet (1,000 mg total) by mouth 3 (three) times daily. 90 tablet 3    vortioxetine (TRINTELLIX) 20 mg Tab Take 1 tablet (20 mg total) by mouth once daily. 90 tablet 3    ciprofloxacin HCl (CIPRO) 500 MG tablet Take 1 tablet (500 mg total) by mouth every 12 (twelve) hours. Take as directed until bottle is empty 10 tablet 0    gabapentin (NEURONTIN) 300 MG capsule Take 1  capsule (300 mg total) by mouth 3 (three) times daily. for 7 days 21 capsule 0    ondansetron (ZOFRAN) 4 MG tablet Take 1 tablet (4 mg total) by mouth every 6 (six) hours as needed for Nausea. 30 tablet 0    oxyCODONE-acetaminophen (PERCOCET)  mg per tablet Take 1 tablet by mouth every 8 (eight) hours as needed for Pain. 20 tablet 0     Current Facility-Administered Medications   Medication Dose Route Frequency Provider Last Rate Last Dose    ketorolac injection 60 mg  60 mg Intramuscular 1 time in Clinic/HOD David Lopez MD           PMFSHx:  Past Medical History:   Diagnosis Date    Anxiety     Chronic back pain     Depression     Herpes     Obesity        Past Surgical History:   Procedure Laterality Date    BREAST SURGERY      reduction     SECTION      2009    susie R wrist    ENDOMETRIAL ABLATION      HYSTERECTOMY      medial branch nerve block       RADIOFREQUENCY ABLATION OF LUMBAR MEDIAL BRANCH NERVE AT SINGLE LEVEL      REDUCTION OF BOTH BREASTS Bilateral 3/13/2019    Procedure: MAMMOPLASTY, REDUCTION, BILATERAL;  Surgeon: Ghanshyam Forte MD;  [facility];  Service: Plastics;  Laterality: Bilateral;    TONSILLECTOMY      TUBAL LIGATION         Family History   Problem Relation Age of Onset    Pancreatic cancer Father     Breast cancer Paternal Aunt     Breast cancer Paternal Grandmother     Breast cancer Paternal Cousin        Social History     Tobacco Use    Smoking status: Former Smoker     Last attempt to quit: 2006     Years since quittin.4    Smokeless tobacco: Never Used   Substance Use Topics    Alcohol use: Yes     Comment: socially    Drug use: Never       Review of Systems   Constitutional: Negative for appetite change, chills, fatigue, fever and unexpected weight change.   HENT: Negative for congestion, dental problem, ear pain, mouth sores, postnasal drip, rhinorrhea, sore throat, tinnitus, trouble swallowing and voice  change.    Eyes: Negative for photophobia, pain, discharge and visual disturbance.   Respiratory: Negative for cough, chest tightness, shortness of breath and wheezing.    Cardiovascular: Negative for chest pain, palpitations and leg swelling.   Gastrointestinal: Negative for abdominal pain, blood in stool, constipation, diarrhea, nausea and vomiting.   Endocrine: Negative for cold intolerance, heat intolerance, polydipsia, polyphagia and polyuria.   Genitourinary: Negative for difficulty urinating, dysuria, flank pain, frequency, hematuria and urgency.   Musculoskeletal: Negative for arthralgias, joint swelling and myalgias.   Skin: Negative for color change and rash.   Allergic/Immunologic: Negative for immunocompromised state.   Neurological: Negative for dizziness, tremors, seizures, syncope, speech difficulty, weakness, numbness and headaches.   Hematological: Negative for adenopathy. Does not bruise/bleed easily.   Psychiatric/Behavioral: Negative for agitation, confusion, hallucinations, self-injury and suicidal ideas. The patient is not nervous/anxious.             Physical Exam   Constitutional: She is oriented to person, place, and time. She appears well-developed and well-nourished. She is active.  Non-toxic appearance. No distress. Body mass index is 36.82 kg/m².   HENT: Head: Normocephalic and atraumatic.   Right Ear: Hearing and external ear normal. No drainage or tenderness.   Left Ear: Hearing and external ear normal. No drainage or tenderness.   Nose: Nose normal. No rhinorrhea. No epistaxis.   Mouth/Throat: Uvula is midline, oropharynx is clear and moist and mucous membranes are normal. Mucous membranes are not pale, not dry and not cyanotic. No oropharyngeal exudate.   Eyes: Pupils are equal, round, and reactive to light. Conjunctivae and lids are normal. Right eye exhibits no discharge and no exudate. Left eye exhibits no discharge and no exudate. Right conjunctiva is not injected. Right eye  exhibits no nystagmus. Left eye exhibits no nystagmus.   Neck: Trachea normal, full passive range of motion without pain and phonation normal. No JVD present. Carotid bruit is not present. No tracheal deviation present. No thyroid mass and no thyromegaly present.   Cardiovascular: Normal rate, regular rhythm, S1 normal, S2 normal, normal heart sounds and intact distal pulses. PMI is not displaced. Exam reveals no gallop and no friction rub.   No murmur heard.  Pulmonary/Chest: Effort normal and breath sounds normal. No accessory muscle usage. No respiratory distress. She exhibits no mass, no tenderness and no crepitus. Right breast exhibits no inverted nipple, no mass, no nipple discharge, no skin change and no tenderness. Left breast exhibits no inverted nipple, no mass, no nipple discharge, no skin change and no tenderness. Breasts are symmetrical.   Abdominal: Soft. Normal appearance and bowel sounds are normal. She exhibits no distension, no fluid wave, no abdominal bruit and no mass. There is no hepatosplenomegaly. There is no tenderness. There is no rebound, no guarding and negative Lobo's sign. No hernia. Hernia confirmed negative in the right inguinal area and confirmed negative in the left inguinal area.   Genitourinary: Rectum normal and vagina normal. There is no rash or lesion on the right labia. There is no rash or lesion on the left labia. Right adnexum displays no mass. Left adnexum displays no mass. No vaginal discharge found.   Musculoskeletal:        Cervical back: Normal.        Thoracic back: Normal.        Lumbar back: Normal.        Right upper arm: Normal.        Left upper arm: Normal.        Right forearm: Normal.        Left forearm: Normal.        Right hand: Normal.        Left hand: Normal.        Right upper leg: Normal.        Left upper leg: Normal.        Right lower leg: Normal.        Left lower leg: Normal.        Right foot: Normal.        Left foot: Normal.   Lymphadenopathy:         Head (right side): No submental, no submandibular and no posterior auricular adenopathy present.        Head (left side): No submental, no submandibular and no posterior auricular adenopathy present.     She has no cervical adenopathy.     She has no axillary adenopathy.        Right: No inguinal and no supraclavicular adenopathy present.        Left: No inguinal and no supraclavicular adenopathy present.   Neurological: She is alert and oriented to person, place, and time. She has normal strength. No cranial nerve deficit or sensory deficit. Coordination and gait normal. GCS eye subscore is 4. GCS verbal subscore is 5. GCS motor subscore is 6.   Skin: Skin is warm, dry and intact. No rash noted. No cyanosis. Nails show no clubbing. 2 cm abscess medial left buttock   Psychiatric: She has a normal mood and affect. Her speech is normal. Judgment and thought content normal. Her mood appears not anxious. Her affect is not inappropriate. She is not actively hallucinating. She does not exhibit a depressed mood.         Medical records:  COVID-19 rapid screening today negative.        Assessment:         1. Left buttock abscess          Plan:     Left buttock abscess  -     Case Request Operating Room: INCISION AND DRAINAGE, ABSCESS      Follow up around 6/24/2020 for routine postop evaluation.  Continue Cipro.  Care as instructed.    Counseling/Medical Decision Making:  Kristy Hernandez was counseled regarding the results of the evaluation thus far and the differential diagnosis.  In addition all options as well as the risks, benefits, possible complications, details of, and indications for each option were also discussed.  Diagnoses discussed included but were not limited to: cellulitis, folliculitis, abscess, carbuncle, fasciitis, hematoma, soft tissue neoplasms, etc. Options discussed included but were not limited to:  nonsurgical management, incision and drainage, second opinion, referral elsewhere for  treatment, observation, etc.  Possible complications of incision and drainage discussed included but were not limited to: bleeding, infections, scars, chronic pain, nerve damage, disability, recurrence, need for additional operations or procedures, metastatic infections, etc. Questions solicited and answered.  Entire conversation was held in laymans terms.  I read the consent form to her verbatim.  All unfamiliar terms defined.  A copy of the consent form was provided for her review outside the office / hospital prior to the procedure.  At the conclusion of the conversation she voiced complete understanding of all we discussed, satisfaction that all questions were fully answered, and that she felt fully informed and completely capable of making an informed decision.  She desires and requests to proceed with an I&D

## 2020-06-11 NOTE — DISCHARGE INSTRUCTIONS
Abscess (Incision & Drainage)  An abscess is sometimes called a boil. It happens when bacteria get trapped under the skin and start to grow. Pus forms inside the abscess as the body responds to the bacteria. An abscess can happen with an insect bite, ingrown hair, blocked oil gland, pimple, cyst, or puncture wound.  Your healthcare provider has drained the pus from your abscess. If the abscess pocket was large, your healthcare provider may have put in gauze packing. Your provider will need to remove it on your next visit. He or she may also replace it at that time. You may not need antibiotics to treat a simple abscess, unless the infection is spreading into the skin around the wound (cellulitis).  The wound will take about 1 to 2 weeks to heal, depending on the size of the abscess. Healthy tissue will grow from the bottom and sides of the opening until it seals over.  Home care  These tips can help your wound heal:  · The wound may drain for the first 2 days. Cover the wound with a clean dry dressing. Change the dressing if it becomes soaked with blood or pus.  · If a gauze packing was placed inside the abscess pocket, you may be told to remove it yourself. You may do this in the shower. Once the packing is removed, you should wash the area in the shower, or clean the area as directed by your provider. Continue to do this until the skin opening has closed. Make sure you wash your hands after changing the packing or cleaning the wound.  · If you were prescribed antibiotics, take them as directed until they are all gone.  · You may use acetaminophen or ibuprofen to control pain, unless another pain medicine was prescribed. If you have liver disease or ever had a stomach ulcer, talk with your doctor before using these medicines.  Follow-up care  Follow up with your healthcare provider, or as advised. If a gauze packing was put in your wound, it should be removed in 1 to 2 days. Check your wound every day for any  signs that the infection is getting worse. The signs are listed below.  When to seek medical advice  Call your healthcare provider right away if any of these occur:  · Increasing redness or swelling  · Red streaks in the skin leading away from the wound  · Increasing local pain or swelling  · Continued pus draining from the wound 2 days after treatment  · Fever of 100.4ºF (38ºC) or higher, or as directed by your healthcare provider  · Boil returns when you are at home  Date Last Reviewed: 9/1/2016  © 9822-7413 [employer]. [address] [place] [address]. All rights reserved. This information is not intended as a substitute for professional medical care. Always follow your healthcare professional's instructions.        Discharge Instructions: After Your Surgery  Youve just had surgery. During surgery, you were given medicine called anesthesia to keep you relaxed and free of pain. After surgery, you may have some pain or nausea. This is common. Here are some tips for feeling better and getting well after surgery.     Stay on schedule with your medicine.   Going home  Your healthcare provider will show you how to take care of yourself when you go home. He or she will also answer your questions. Have an adult family member or friend drive you home. For the first 24 hours after your surgery:  · Do not drive or use heavy equipment.  · Do not make important decisions or sign legal papers.  · Do not drink alcohol.  · Have someone stay with you, if needed. He or she can watch for problems and help keep you safe.  Be sure to go to all follow-up visits with your healthcare provider. And rest after your surgery for as long as your healthcare provider tells you to.  Coping with pain  If you have pain after surgery, pain medicine will help you feel better. Take it as told, before pain becomes severe. Also, ask your healthcare provider or pharmacist about other ways to control pain. This might be with  heat, ice, or relaxation. And follow any other instructions your surgeon or nurse gives you.  Tips for taking pain medicine  To get the best relief possible, remember these points:  · Pain medicines can upset your stomach. Taking them with a little food may help.  · Most pain relievers taken by mouth need at least 20 to 30 minutes to start to work.  · Taking medicine on a schedule can help you remember to take it. Try to time your medicine so that you can take it before starting an activity. This might be before you get dressed, go for a walk, or sit down for dinner.  · Constipation is a common side effect of pain medicines. Call your healthcare provider before taking any medicines such as laxatives or stool softeners to help ease constipation. Also ask if you should skip any foods. Drinking lots of fluids and eating foods such as fruits and vegetables that are high in fiber can also help. Remember, do not take laxatives unless your surgeon has prescribed them.  · Drinking alcohol and taking pain medicine can cause dizziness and slow your breathing. It can even be deadly. Do not drink alcohol while taking pain medicine.  · Pain medicine can make you react more slowly to things. Do not drive or run machinery while taking pain medicine.  Your healthcare provider may tell you to take acetaminophen to help ease your pain. Ask him or her how much you are supposed to take each day. Acetaminophen or other pain relievers may interact with your prescription medicines or other over-the-counter (OTC) medicines. Some prescription medicines have acetaminophen and other ingredients. Using both prescription and OTC acetaminophen for pain can cause you to overdose. Read the labels on your OTC medicines with care. This will help you to clearly know the list of ingredients, how much to take, and any warnings. It may also help you not take too much acetaminophen. If you have questions or do not understand the information, ask your  pharmacist or healthcare provider to explain it to you before you take the OTC medicine.  Managing nausea  Some people have an upset stomach after surgery. This is often because of anesthesia, pain, or pain medicine, or the stress of surgery. These tips will help you handle nausea and eat healthy foods as you get better. If you were on a special food plan before surgery, ask your healthcare provider if you should follow it while you get better. These tips may help:  · Do not push yourself to eat. Your body will tell you when to eat and how much.  · Start off with clear liquids and soup. They are easier to digest.  · Next try semi-solid foods, such as mashed potatoes, applesauce, and gelatin, as you feel ready.  · Slowly move to solid foods. Dont eat fatty, rich, or spicy foods at first.  · Do not force yourself to have 3 large meals a day. Instead eat smaller amounts more often.  · Take pain medicines with a small amount of solid food, such as crackers or toast, to avoid nausea.     Call your surgeon if  · You still have pain an hour after taking medicine. The medicine may not be strong enough.  · You feel too sleepy, dizzy, or groggy. The medicine may be too strong.  · You have side effects like nausea, vomiting, or skin changes, such as rash, itching, or hives.       If you have obstructive sleep apnea  You were given anesthesia medicine during surgery to keep you comfortable and free of pain. After surgery, you may have more apnea spells because of this medicine and other medicines you were given. The spells may last longer than usual.   At home:  · Keep using the continuous positive airway pressure (CPAP) device when you sleep. Unless your healthcare provider tells you not to, use it when you sleep, day or night. CPAP is a common device used to treat obstructive sleep apnea.  · Talk with your provider before taking any pain medicine, muscle relaxants, or sedatives. Your provider will tell you about the  possible dangers of taking these medicines.  Date Last Reviewed: 12/1/2016  © 0326-5224 [employer]. [address] [place] [address], [place] [address]. All rights reserved. This information is not intended as a substitute for professional medical care. Always follow your healthcare professional's instructions.

## 2020-06-11 NOTE — PLAN OF CARE
German is here in follow up to his obstructive sleep apnea. He has not been seen in about 1 year. He is currently using APAP at 15-20 cm H20. He wears his CPAP nightly. He is sleeping well throughout the night. He is taking naps in his recliner downstairs for about 20-30 minutes on most days (without CPAP). He tells me that this is out of habit. He does not always need a nap but he tends to need one on busier days. Otherwise, he is doing very well. His CPAP download shows corrected sleep apnea with excellent compliance without leak. Usage and sleep time is just under 7 hours. The data was reviewed by me and discussed with the patient. Care of the patient's CPAP and routine replacement of supplies (every 6-12 months) was reviewed.  At this time, we will continue his current pressures. Recommended that he get at least 7.5 hours to 8 hours of sleep nightly. With this, he may be able to eliminate daytime napping. If he still needs a nap, then recommended that he wear his CPAP for all naps over 20 minutes. We will place an order with [facility] for CPAP supplies. Since he is doing well, we will plan for followup in 1 year or sooner if needs arise. All questions were answered and he does appear to understand well.    Pt verbalized understanding of discharge orders. Pt sent home with supplies. Pt sister and mother verbalized understanding of discharge instructions.

## 2020-06-11 NOTE — DISCHARGE SUMMARY
[facility]  Discharge Note  Short Stay    Procedure(s) (LRB):  INCISION AND DRAINAGE, ABSCESS (Left)    OUTCOME: Patient tolerated treatment/procedure well without complication and is now ready for discharge.    DISPOSITION: Home or Self Care    FINAL DIAGNOSIS:  Left buttock abscess    FOLLOWUP: In clinic    DISCHARGE INSTRUCTIONS:    Discharge Procedure Orders   Diet Adult Regular     No driving until:     Order Specific Question Answer Comments   Date: 6/12/2020      Change dressing (specify)   Order Comments: Dressing change daily starting tomorrow.  Remove tape and outer gauze.  Shower normally.  Allow packing gauze to become saturated with soapy water.  Removed packing while in the shower.  Continue showering normally.  Allow soapy water to run through the wound.  After completing the shower apply antibiotic ointment to the wound and cover with dry gauze, secure with tape.  Thereafter, flush wound 3 or 4 times daily with warm soapy water, apply antibiotic ointment, cover with dry gauze, and secure with tape.  Continue flushing and applying antibiotic ointment until wound has healed.        Clinical Reference Documents Added to Patient Instructions       Document    ABSCESS, INCISION AND DRAINAGE (ENGLISH)    AFTER YOUR SURGERY: DISCHARGE INSTRUCTIONS (ENGLISH)

## 2020-06-16 LAB
BACTERIA SPEC AEROBE CULT: NORMAL
BACTERIA SPEC ANAEROBE CULT: ABNORMAL

## 2020-06-20 DIAGNOSIS — Z01.84 ANTIBODY RESPONSE EXAMINATION: ICD-10-CM

## 2020-06-23 LAB
FINAL PATHOLOGIC DIAGNOSIS: NORMAL
GROSS: NORMAL

## 2020-06-24 ENCOUNTER — OFFICE VISIT (OUTPATIENT)
Dept: SURGERY | Facility: CLINIC | Age: 45
End: 2020-06-24
Payer: COMMERCIAL

## 2020-06-24 VITALS
BODY MASS INDEX: 36.8 KG/M2 | SYSTOLIC BLOOD PRESSURE: 131 MMHG | OXYGEN SATURATION: 97 % | HEART RATE: 73 BPM | RESPIRATION RATE: 14 BRPM | DIASTOLIC BLOOD PRESSURE: 84 MMHG | HEIGHT: 62 IN | WEIGHT: 200 LBS | TEMPERATURE: 98 F

## 2020-06-24 DIAGNOSIS — Z48.89 ENCOUNTER FOR POSTOPERATIVE CARE: Primary | ICD-10-CM

## 2020-06-24 PROCEDURE — 99024 POSTOP FOLLOW-UP VISIT: CPT | Mod: S$GLB,,, | Performed by: SURGERY

## 2020-06-24 PROCEDURE — 99024 PR POST-OP FOLLOW-UP VISIT: ICD-10-PCS | Mod: S$GLB,,, | Performed by: SURGERY

## 2020-07-14 ENCOUNTER — PATIENT OUTREACH (OUTPATIENT)
Dept: ADMINISTRATIVE | Facility: OTHER | Age: 45
End: 2020-07-14

## 2020-07-15 VITALS
HEART RATE: 88 BPM | SYSTOLIC BLOOD PRESSURE: 139 MMHG | RESPIRATION RATE: 10 BRPM | DIASTOLIC BLOOD PRESSURE: 92 MMHG | TEMPERATURE: 99 F | WEIGHT: 201 LBS | BODY MASS INDEX: 36.99 KG/M2 | HEIGHT: 62 IN | OXYGEN SATURATION: 97 %

## 2020-07-15 NOTE — PROGRESS NOTES
Requested updates within Care Everywhere.  Patient's chart was reviewed for overdue SUZANNE topics.  Immunizations reconciled.

## 2020-07-20 DIAGNOSIS — Z01.84 ANTIBODY RESPONSE EXAMINATION: ICD-10-CM

## 2020-07-22 ENCOUNTER — OFFICE VISIT (OUTPATIENT)
Dept: FAMILY MEDICINE | Facility: CLINIC | Age: 45
End: 2020-07-22
Payer: COMMERCIAL

## 2020-07-22 ENCOUNTER — OFFICE VISIT (OUTPATIENT)
Dept: SURGERY | Facility: CLINIC | Age: 45
End: 2020-07-22
Payer: COMMERCIAL

## 2020-07-22 VITALS
WEIGHT: 194 LBS | HEART RATE: 82 BPM | RESPIRATION RATE: 20 BRPM | OXYGEN SATURATION: 98 % | BODY MASS INDEX: 35.7 KG/M2 | SYSTOLIC BLOOD PRESSURE: 125 MMHG | DIASTOLIC BLOOD PRESSURE: 86 MMHG | HEIGHT: 62 IN | TEMPERATURE: 98 F

## 2020-07-22 VITALS
HEIGHT: 62 IN | DIASTOLIC BLOOD PRESSURE: 86 MMHG | HEART RATE: 82 BPM | TEMPERATURE: 98 F | BODY MASS INDEX: 35.85 KG/M2 | WEIGHT: 194.81 LBS | RESPIRATION RATE: 20 BRPM | OXYGEN SATURATION: 98 % | SYSTOLIC BLOOD PRESSURE: 125 MMHG

## 2020-07-22 DIAGNOSIS — L02.91 ABSCESS: Primary | ICD-10-CM

## 2020-07-22 DIAGNOSIS — L02.31 LEFT BUTTOCK ABSCESS: Primary | ICD-10-CM

## 2020-07-22 PROCEDURE — 99999 PR PBB SHADOW E&M-EST. PATIENT-LVL III: CPT | Mod: PBBFAC,,, | Performed by: FAMILY MEDICINE

## 2020-07-22 PROCEDURE — 99214 PR OFFICE/OUTPT VISIT, EST, LEVL IV, 30-39 MIN: ICD-10-PCS | Mod: S$GLB,,, | Performed by: SURGERY

## 2020-07-22 PROCEDURE — 99999 PR PBB SHADOW E&M-EST. PATIENT-LVL III: ICD-10-PCS | Mod: PBBFAC,,, | Performed by: FAMILY MEDICINE

## 2020-07-22 PROCEDURE — 87075 CULTR BACTERIA EXCEPT BLOOD: CPT

## 2020-07-22 PROCEDURE — 99214 OFFICE O/P EST MOD 30 MIN: CPT | Mod: S$GLB,,, | Performed by: SURGERY

## 2020-07-22 PROCEDURE — 99024 POSTOP FOLLOW-UP VISIT: CPT | Mod: S$GLB,,, | Performed by: FAMILY MEDICINE

## 2020-07-22 PROCEDURE — 99024 PR POST-OP FOLLOW-UP VISIT: ICD-10-PCS | Mod: S$GLB,,, | Performed by: FAMILY MEDICINE

## 2020-07-22 PROCEDURE — 3008F BODY MASS INDEX DOCD: CPT | Mod: S$GLB,,, | Performed by: SURGERY

## 2020-07-22 PROCEDURE — 3008F PR BODY MASS INDEX (BMI) DOCUMENTED: ICD-10-PCS | Mod: S$GLB,,, | Performed by: SURGERY

## 2020-07-22 RX ORDER — SODIUM CHLORIDE 9 MG/ML
INJECTION, SOLUTION INTRAVENOUS CONTINUOUS
Status: CANCELLED | OUTPATIENT
Start: 2020-07-22

## 2020-07-22 RX ORDER — METRONIDAZOLE 500 MG/1
500 TABLET ORAL EVERY 8 HOURS
Qty: 30 TABLET | Refills: 0 | Status: SHIPPED | OUTPATIENT
Start: 2020-07-22 | End: 2020-08-01

## 2020-07-22 NOTE — PROGRESS NOTES
"Subjective:       Patient ID: Kristy Hernandez is a 44 y.o. female.    Chief Complaint: Follow-up    Left buttock abscess  Was drained about one week ago  Now with purulent drainage  + fever  Taking bactim with little relief        Review of Systems   Constitutional: Negative for activity change, chills, fatigue and fever.   Respiratory: Negative for cough, chest tightness, shortness of breath and wheezing.    Cardiovascular: Negative for chest pain and palpitations.   Gastrointestinal: Negative for abdominal pain, diarrhea, nausea and vomiting.   Musculoskeletal: Negative for arthralgias and myalgias.   Skin: Positive for color change.   Neurological: Negative for tremors, syncope, weakness and headaches.   Psychiatric/Behavioral: The patient is not nervous/anxious.          Reviewed family, medical, surgical, and social history.    Objective:      /86 (BP Location: Left arm, Patient Position: Sitting, BP Method: Medium (Automatic))   Pulse 82   Temp 98.2 °F (36.8 °C) (Oral)   Resp 20   Ht 5' 2" (1.575 m)   Wt 88.4 kg (194 lb 12.8 oz)   SpO2 98%   BMI 35.63 kg/m²   Physical Exam  Constitutional:       Appearance: She is well-developed.   HENT:      Head: Normocephalic.      Right Ear: External ear normal.      Left Ear: External ear normal.      Nose: Nose normal.   Eyes:      Conjunctiva/sclera: Conjunctivae normal.      Pupils: Pupils are equal, round, and reactive to light.   Neck:      Musculoskeletal: Normal range of motion.   Cardiovascular:      Rate and Rhythm: Normal rate and regular rhythm.      Heart sounds: Normal heart sounds. No murmur.   Pulmonary:      Effort: Pulmonary effort is normal.      Breath sounds: Normal breath sounds. No wheezing.   Abdominal:      General: Bowel sounds are normal.      Palpations: Abdomen is soft. There is no mass.   Musculoskeletal: Normal range of motion.   Skin:     General: Skin is warm.      Capillary Refill: Capillary refill takes less than 2 " seconds.      Findings: Erythema present.          Neurological:      Mental Status: She is alert and oriented to person, place, and time.   Psychiatric:         Behavior: Behavior normal.         Thought Content: Thought content normal.         Judgment: Judgment normal.         Assessment:       1. Left buttock abscess        Plan:       Left buttock abscess    Will see general surgery this afternoon.        Risks, benefits, and side effects were discussed with the patient. All questions were answered to the fullest satisfaction of the patient, and pt verbalized understanding and agreement to treatment plan. Pt was to call with any new or worsening symptoms, or present to the ER.

## 2020-07-22 NOTE — H&P
[facility] Surgery H&P Note    Subjective:       Patient ID: Kristy Hernandez is a 44 y.o. female.    Chief Complaint: Consult (Abscess x2)    HPI:  Kristy Hernandez is a 44 y.o. female history of anxiety chronic back pain depression presents today as a established patient surgery Clinic with new issue.  Patient previously treated for left buttock abscess over month ago.  Wound completely healed.  She within the last week developed a different site, left buttock abscess, lateral.  Previous was medial.  Underwent incision drainage last week.  Placed on Bactrim therapy.  Wound no improvement since last week.  Obtained clinic visit today of surgery Clinic for evaluation.  Patient also with mild folliculitis left pit is unrelated.  No significant erythema.  Patient states that Bactrim not helped.  She desires surgical excision    Past Medical History:   Diagnosis Date    Anxiety     Chronic back pain     Depression     Herpes     Obesity      Past Surgical History:   Procedure Laterality Date    BREAST SURGERY      reduction     SECTION      2009    dequervein R wrist    ENDOMETRIAL ABLATION      HYSTERECTOMY      INCISION AND DRAINAGE OF ABSCESS Left 2020    Procedure: INCISION AND DRAINAGE, ABSCESS;  Surgeon: Joshua Guillermo MD;  Location: [facility] OR;  Service: General;  Laterality: Left;    medial branch nerve block       RADIOFREQUENCY ABLATION OF LUMBAR MEDIAL BRANCH NERVE AT SINGLE LEVEL      REDUCTION OF BOTH BREASTS Bilateral 3/13/2019    Procedure: MAMMOPLASTY, REDUCTION, BILATERAL;  Surgeon: Ghanshyam Forte MD;  Location: [facility] OR;  Service: Plastics;  Laterality: Bilateral;    TONSILLECTOMY      TUBAL LIGATION       Family History   Problem Relation Age of Onset    Pancreatic cancer Father     Breast cancer Paternal Aunt     Breast cancer Paternal Grandmother     Breast cancer Paternal Cousin      Social History     Socioeconomic History    Marital status:       Spouse name: Not on file    Number of children: Not on file    Years of education: Not on file    Highest education level: Not on file   Occupational History    Not on file   Social Needs    Financial resource strain: Not on file    Food insecurity     Worry: Not on file     Inability: Not on file    Transportation needs     Medical: Not on file     Non-medical: Not on file   Tobacco Use    Smoking status: Former Smoker     Quit date:      Years since quittin.5    Smokeless tobacco: Never Used   Substance and Sexual Activity    Alcohol use: Yes     Comment: socially    Drug use: Never    Sexual activity: Yes   Lifestyle    Physical activity     Days per week: Not on file     Minutes per session: Not on file    Stress: Not on file   Relationships    Social connections     Talks on phone: Not on file     Gets together: Not on file     Attends Hinduism service: Not on file     Active member of club or organization: Not on file     Attends meetings of clubs or organizations: Not on file     Relationship status: Not on file   Other Topics Concern    Not on file   Social History Narrative    Not on file       Current Outpatient Medications   Medication Sig Dispense Refill    clonazePAM (KLONOPIN) 0.5 MG tablet Take 1 tablet (0.5 mg total) by mouth 2 (two) times daily as needed for Anxiety. 30 tablet 0    gabapentin (NEURONTIN) 300 MG capsule Take 1 capsule (300 mg total) by mouth 3 (three) times daily. for 7 days 21 capsule 0    methocarbamol (ROBAXIN) 500 MG Tab Take 2 tablets (1,000 mg total) by mouth 3 (three) times daily. (Patient taking differently: Take 1,000 mg by mouth 3 (three) times daily as needed. ) 100 tablet 0    ondansetron (ZOFRAN) 4 MG tablet Take 1 tablet (4 mg total) by mouth every 6 (six) hours as needed for Nausea. 30 tablet 0    oxyCODONE-acetaminophen (PERCOCET)  mg per tablet Take 1 tablet by mouth every 8 (eight) hours as needed for Pain. 20 tablet  "0    sulfamethoxazole/trimethoprim (BACTRIM ORAL) Take by mouth.      valACYclovir (VALTREX) 1000 MG tablet Take 1 tablet (1,000 mg total) by mouth 3 (three) times daily. 90 tablet 3    vortioxetine (TRINTELLIX) 20 mg Tab Take 1 tablet (20 mg total) by mouth once daily. 90 tablet 3    metroNIDAZOLE (FLAGYL) 500 MG tablet Take 1 tablet (500 mg total) by mouth every 8 (eight) hours. for 10 days 30 tablet 0     Current Facility-Administered Medications   Medication Dose Route Frequency Provider Last Rate Last Dose    ketorolac injection 60 mg  60 mg Intramuscular 1 time in Clinic/HOD David Lopez MD         Review of patient's allergies indicates:  No Known Allergies    Review of Systems   Constitutional: Negative for activity change, appetite change, chills and fever.   HENT: Negative for congestion, dental problem and ear discharge.    Eyes: Negative for discharge and itching.   Respiratory: Negative for apnea, choking and chest tightness.    Cardiovascular: Negative for chest pain and leg swelling.   Gastrointestinal: Negative for abdominal distention, abdominal pain, anal bleeding, constipation, diarrhea and nausea.   Endocrine: Negative for cold intolerance and heat intolerance.   Genitourinary: Negative for difficulty urinating and dyspareunia.   Musculoskeletal: Negative for arthralgias and back pain.   Skin: Positive for wound. Negative for color change and pallor.   Neurological: Negative for dizziness and facial asymmetry.   Hematological: Negative for adenopathy. Does not bruise/bleed easily.   Psychiatric/Behavioral: Negative for agitation and behavioral problems.       Objective:      Vitals:    07/22/20 1344   BP: 125/86   Pulse: 82   Resp: 20   Temp: 98.2 °F (36.8 °C)   SpO2: 98%   Weight: 88 kg (194 lb)   Height: 5' 2" (1.575 m)     Physical Exam  Constitutional:       General: She is not in acute distress.     Appearance: She is well-developed.   HENT:      Head: Normocephalic and atraumatic. "   Eyes:      Pupils: Pupils are equal, round, and reactive to light.   Neck:      Musculoskeletal: Normal range of motion and neck supple.      Thyroid: No thyromegaly.   Cardiovascular:      Rate and Rhythm: Normal rate and regular rhythm.   Pulmonary:      Effort: Pulmonary effort is normal.      Breath sounds: Normal breath sounds.   Abdominal:      General: Bowel sounds are normal. There is no distension.      Palpations: Abdomen is soft.      Tenderness: There is no abdominal tenderness.   Musculoskeletal: Normal range of motion.         General: No deformity.        Back:    Skin:     General: Skin is warm.      Capillary Refill: Capillary refill takes less than 2 seconds.      Findings: No erythema.   Neurological:      Mental Status: She is alert and oriented to person, place, and time.      Cranial Nerves: No cranial nerve deficit.   Psychiatric:         Behavior: Behavior normal.         Lab Review:   CBC:   Lab Results   Component Value Date    WBC 9.74 10/28/2019    RBC 4.50 10/28/2019    HGB 12.5 10/28/2019    HCT 38.3 10/28/2019     10/28/2019     BMP:   Lab Results   Component Value Date     (H) 10/28/2019     10/28/2019    K 3.4 (L) 10/28/2019     10/28/2019    CO2 20 (L) 10/28/2019    BUN 16 10/28/2019    CREATININE 0.9 10/28/2019    CALCIUM 9.1 10/28/2019     Diagnostics Review: Previous cultures reviewed.  Anaerobic species.     Assessment:       1. Abscess        Plan:   Abscess  -     Case Request Operating Room: EXCISION, ABSCESS  -     Insert peripheral IV; Standing  -     Basic metabolic panel; Future; Expected date: 07/22/2020  -     CBC auto differential; Future; Expected date: 07/22/2020  -     Full code; Standing  -     metroNIDAZOLE (FLAGYL) 500 MG tablet; Take 1 tablet (500 mg total) by mouth every 8 (eight) hours. for 10 days  Dispense: 30 tablet; Refill: 0    Other orders  -     Progressive Mobility Protocol (mobilize patient to their highest level of  functioning at least twice daily); Standing        Medical Decision Making/Counseling:  Patient with abscess left buttock.  Currently draining.  Options to include continued observation, transition of antibiotics to include anaerobic coverage with Flagyl versus surgical excision were all discussed.  Risk benefits of both options were discussed with the patient in detail.  After risk benefits discussion, patient voiced understanding risk benefits wished to proceed with surgical excision tomorrow.  Necessity to leave wound open to heal by secondary intention was discussed.  Dressing changes, daily, wet to dry, prolonged were discussed.  Risk and benefits were discussed, patient voiced understanding wished proceed tomorrow.  Left axillary mild folliculitis, recommend continue conservative management.  P.o. antibiotics will help as prescribed.    Patient instructed that best way to communicate with my office staff is for patient to get on the [facility] epic patient portal to expedite communication and communication issues that may occur.  Patient was given instructions on how to get on the portal.  I encouraged patient to obtain portal access as well.  Ultimately it is up to the patient to obtain access.  Patient voiced understanding.

## 2020-07-22 NOTE — H&P (VIEW-ONLY)
[facility] Surgery H&P Note    Subjective:       Patient ID: Kristy Hernandez is a 44 y.o. female.    Chief Complaint: Consult (Abscess x2)    HPI:  Kristy Hernandez is a 44 y.o. female history of anxiety chronic back pain depression presents today as a established patient surgery Clinic with new issue.  Patient previously treated for left buttock abscess over month ago.  Wound completely healed.  She within the last week developed a different site, left buttock abscess, lateral.  Previous was medial.  Underwent incision drainage last week.  Placed on Bactrim therapy.  Wound no improvement since last week.  Obtained clinic visit today of surgery Clinic for evaluation.  Patient also with mild folliculitis left pit is unrelated.  No significant erythema.  Patient states that Bactrim not helped.  She desires surgical excision    Past Medical History:   Diagnosis Date    Anxiety     Chronic back pain     Depression     Herpes     Obesity      Past Surgical History:   Procedure Laterality Date    BREAST SURGERY      reduction     SECTION      2009    dequervein R wrist    ENDOMETRIAL ABLATION      HYSTERECTOMY      INCISION AND DRAINAGE OF ABSCESS Left 2020    Procedure: INCISION AND DRAINAGE, ABSCESS;  Surgeon: Joshua Guillermo MD;  Location: [facility] OR;  Service: General;  Laterality: Left;    medial branch nerve block       RADIOFREQUENCY ABLATION OF LUMBAR MEDIAL BRANCH NERVE AT SINGLE LEVEL      REDUCTION OF BOTH BREASTS Bilateral 3/13/2019    Procedure: MAMMOPLASTY, REDUCTION, BILATERAL;  Surgeon: Ghanshyam Forte MD;  Location: [facility] OR;  Service: Plastics;  Laterality: Bilateral;    TONSILLECTOMY      TUBAL LIGATION       Family History   Problem Relation Age of Onset    Pancreatic cancer Father     Breast cancer Paternal Aunt     Breast cancer Paternal Grandmother     Breast cancer Paternal Cousin      Social History     Socioeconomic History    Marital status:       Spouse name: Not on file    Number of children: Not on file    Years of education: Not on file    Highest education level: Not on file   Occupational History    Not on file   Social Needs    Financial resource strain: Not on file    Food insecurity     Worry: Not on file     Inability: Not on file    Transportation needs     Medical: Not on file     Non-medical: Not on file   Tobacco Use    Smoking status: Former Smoker     Quit date:      Years since quittin.5    Smokeless tobacco: Never Used   Substance and Sexual Activity    Alcohol use: Yes     Comment: socially    Drug use: Never    Sexual activity: Yes   Lifestyle    Physical activity     Days per week: Not on file     Minutes per session: Not on file    Stress: Not on file   Relationships    Social connections     Talks on phone: Not on file     Gets together: Not on file     Attends Christianity service: Not on file     Active member of club or organization: Not on file     Attends meetings of clubs or organizations: Not on file     Relationship status: Not on file   Other Topics Concern    Not on file   Social History Narrative    Not on file       Current Outpatient Medications   Medication Sig Dispense Refill    clonazePAM (KLONOPIN) 0.5 MG tablet Take 1 tablet (0.5 mg total) by mouth 2 (two) times daily as needed for Anxiety. 30 tablet 0    gabapentin (NEURONTIN) 300 MG capsule Take 1 capsule (300 mg total) by mouth 3 (three) times daily. for 7 days 21 capsule 0    methocarbamol (ROBAXIN) 500 MG Tab Take 2 tablets (1,000 mg total) by mouth 3 (three) times daily. (Patient taking differently: Take 1,000 mg by mouth 3 (three) times daily as needed. ) 100 tablet 0    ondansetron (ZOFRAN) 4 MG tablet Take 1 tablet (4 mg total) by mouth every 6 (six) hours as needed for Nausea. 30 tablet 0    oxyCODONE-acetaminophen (PERCOCET)  mg per tablet Take 1 tablet by mouth every 8 (eight) hours as needed for Pain. 20 tablet  "0    sulfamethoxazole/trimethoprim (BACTRIM ORAL) Take by mouth.      valACYclovir (VALTREX) 1000 MG tablet Take 1 tablet (1,000 mg total) by mouth 3 (three) times daily. 90 tablet 3    vortioxetine (TRINTELLIX) 20 mg Tab Take 1 tablet (20 mg total) by mouth once daily. 90 tablet 3    metroNIDAZOLE (FLAGYL) 500 MG tablet Take 1 tablet (500 mg total) by mouth every 8 (eight) hours. for 10 days 30 tablet 0     Current Facility-Administered Medications   Medication Dose Route Frequency Provider Last Rate Last Dose    ketorolac injection 60 mg  60 mg Intramuscular 1 time in Clinic/HOD David Lopez MD         Review of patient's allergies indicates:  No Known Allergies    Review of Systems   Constitutional: Negative for activity change, appetite change, chills and fever.   HENT: Negative for congestion, dental problem and ear discharge.    Eyes: Negative for discharge and itching.   Respiratory: Negative for apnea, choking and chest tightness.    Cardiovascular: Negative for chest pain and leg swelling.   Gastrointestinal: Negative for abdominal distention, abdominal pain, anal bleeding, constipation, diarrhea and nausea.   Endocrine: Negative for cold intolerance and heat intolerance.   Genitourinary: Negative for difficulty urinating and dyspareunia.   Musculoskeletal: Negative for arthralgias and back pain.   Skin: Positive for wound. Negative for color change and pallor.   Neurological: Negative for dizziness and facial asymmetry.   Hematological: Negative for adenopathy. Does not bruise/bleed easily.   Psychiatric/Behavioral: Negative for agitation and behavioral problems.       Objective:      Vitals:    07/22/20 1344   BP: 125/86   Pulse: 82   Resp: 20   Temp: 98.2 °F (36.8 °C)   SpO2: 98%   Weight: 88 kg (194 lb)   Height: 5' 2" (1.575 m)     Physical Exam  Constitutional:       General: She is not in acute distress.     Appearance: She is well-developed.   HENT:      Head: Normocephalic and atraumatic. "   Eyes:      Pupils: Pupils are equal, round, and reactive to light.   Neck:      Musculoskeletal: Normal range of motion and neck supple.      Thyroid: No thyromegaly.   Cardiovascular:      Rate and Rhythm: Normal rate and regular rhythm.   Pulmonary:      Effort: Pulmonary effort is normal.      Breath sounds: Normal breath sounds.   Abdominal:      General: Bowel sounds are normal. There is no distension.      Palpations: Abdomen is soft.      Tenderness: There is no abdominal tenderness.   Musculoskeletal: Normal range of motion.         General: No deformity.        Back:    Skin:     General: Skin is warm.      Capillary Refill: Capillary refill takes less than 2 seconds.      Findings: No erythema.   Neurological:      Mental Status: She is alert and oriented to person, place, and time.      Cranial Nerves: No cranial nerve deficit.   Psychiatric:         Behavior: Behavior normal.         Lab Review:   CBC:   Lab Results   Component Value Date    WBC 9.74 10/28/2019    RBC 4.50 10/28/2019    HGB 12.5 10/28/2019    HCT 38.3 10/28/2019     10/28/2019     BMP:   Lab Results   Component Value Date     (H) 10/28/2019     10/28/2019    K 3.4 (L) 10/28/2019     10/28/2019    CO2 20 (L) 10/28/2019    BUN 16 10/28/2019    CREATININE 0.9 10/28/2019    CALCIUM 9.1 10/28/2019     Diagnostics Review: Previous cultures reviewed.  Anaerobic species.     Assessment:       1. Abscess        Plan:   Abscess  -     Case Request Operating Room: EXCISION, ABSCESS  -     Insert peripheral IV; Standing  -     Basic metabolic panel; Future; Expected date: 07/22/2020  -     CBC auto differential; Future; Expected date: 07/22/2020  -     Full code; Standing  -     metroNIDAZOLE (FLAGYL) 500 MG tablet; Take 1 tablet (500 mg total) by mouth every 8 (eight) hours. for 10 days  Dispense: 30 tablet; Refill: 0    Other orders  -     Progressive Mobility Protocol (mobilize patient to their highest level of  functioning at least twice daily); Standing        Medical Decision Making/Counseling:  Patient with abscess left buttock.  Currently draining.  Options to include continued observation, transition of antibiotics to include anaerobic coverage with Flagyl versus surgical excision were all discussed.  Risk benefits of both options were discussed with the patient in detail.  After risk benefits discussion, patient voiced understanding risk benefits wished to proceed with surgical excision tomorrow.  Necessity to leave wound open to heal by secondary intention was discussed.  Dressing changes, daily, wet to dry, prolonged were discussed.  Risk and benefits were discussed, patient voiced understanding wished proceed tomorrow.  Left axillary mild folliculitis, recommend continue conservative management.  P.o. antibiotics will help as prescribed.    Patient instructed that best way to communicate with my office staff is for patient to get on the [facility] epic patient portal to expedite communication and communication issues that may occur.  Patient was given instructions on how to get on the portal.  I encouraged patient to obtain portal access as well.  Ultimately it is up to the patient to obtain access.  Patient voiced understanding.

## 2020-07-22 NOTE — LETTER
July 22, 2020      David Lopez MD  [address]  [place] [address]           [facility]  [address] [place] [address]  Phone: [phone]  Fax: [phone]          Patient: Kristy Hernandez   MR Number: [id number]   YOB: 1975   Date of Visit: 7/22/2020       Dear Dr. David Lopez:    Thank you for referring Kristy Hernandez to me for evaluation. Attached you will find relevant portions of my assessment and plan of care.    If you have questions, please do not hesitate to call me. I look forward to following Kristy Hernandez along with you.    Sincerely,    Marv Overton MD    Enclosure  CC:  No Recipients    If you would like to receive this communication electronically, please contact [email or web address] or [phone] to request more information on [employer] Link access.    For providers and/or their staff who would like to refer a patient to [facility], please contact us through our one-stop-shop provider referral line, [facility], at [phone].    If you feel you have received this communication in error or would no longer like to receive these types of communications, please e-mail [email or web address]

## 2020-07-23 ENCOUNTER — HOSPITAL ENCOUNTER (OUTPATIENT)
Facility: HOSPITAL | Age: 45
Discharge: HOME | End: 2020-07-23
Attending: SURGERY | Admitting: SURGERY
Payer: COMMERCIAL

## 2020-07-23 ENCOUNTER — ANESTHESIA EVENT (OUTPATIENT)
Dept: SURGERY | Facility: HOSPITAL | Age: 45
End: 2020-07-23
Payer: COMMERCIAL

## 2020-07-23 ENCOUNTER — ANESTHESIA (OUTPATIENT)
Dept: SURGERY | Facility: HOSPITAL | Age: 45
End: 2020-07-23
Payer: COMMERCIAL

## 2020-07-23 VITALS
SYSTOLIC BLOOD PRESSURE: 109 MMHG | HEART RATE: 84 BPM | DIASTOLIC BLOOD PRESSURE: 72 MMHG | OXYGEN SATURATION: 96 % | TEMPERATURE: 98 F | WEIGHT: 194 LBS | RESPIRATION RATE: 18 BRPM | HEIGHT: 62 IN | BODY MASS INDEX: 35.7 KG/M2

## 2020-07-23 DIAGNOSIS — L02.91 ABSCESS: ICD-10-CM

## 2020-07-23 LAB — SARS-COV-2 RDRP RESP QL NAA+PROBE: NEGATIVE

## 2020-07-23 PROCEDURE — 25000003 PHARM REV CODE 250: Performed by: NURSE ANESTHETIST, CERTIFIED REGISTERED

## 2020-07-23 PROCEDURE — 71000015 HC POSTOP RECOV 1ST HR: Performed by: SURGERY

## 2020-07-23 PROCEDURE — 10060 PR DRAIN SKIN ABSCESS SIMPLE: ICD-10-PCS | Mod: ,,, | Performed by: SURGERY

## 2020-07-23 PROCEDURE — 88304 PR  SURG PATH,LEVEL III: ICD-10-PCS | Mod: 26,,, | Performed by: PATHOLOGY

## 2020-07-23 PROCEDURE — 87070 CULTURE OTHR SPECIMN AEROBIC: CPT

## 2020-07-23 PROCEDURE — 88304 TISSUE EXAM BY PATHOLOGIST: CPT | Mod: 26,,, | Performed by: PATHOLOGY

## 2020-07-23 PROCEDURE — 36000705 HC OR TIME LEV I EA ADD 15 MIN: Performed by: SURGERY

## 2020-07-23 PROCEDURE — 63600175 PHARM REV CODE 636 W HCPCS: Performed by: SURGERY

## 2020-07-23 PROCEDURE — 25000003 PHARM REV CODE 250: Performed by: SURGERY

## 2020-07-23 PROCEDURE — 63600175 PHARM REV CODE 636 W HCPCS: Performed by: NURSE ANESTHETIST, CERTIFIED REGISTERED

## 2020-07-23 PROCEDURE — 87186 SC STD MICRODIL/AGAR DIL: CPT

## 2020-07-23 PROCEDURE — 37000009 HC ANESTHESIA EA ADD 15 MINS: Performed by: SURGERY

## 2020-07-23 PROCEDURE — 37000008 HC ANESTHESIA 1ST 15 MINUTES: Performed by: SURGERY

## 2020-07-23 PROCEDURE — 36000704 HC OR TIME LEV I 1ST 15 MIN: Performed by: SURGERY

## 2020-07-23 PROCEDURE — 63600175 PHARM REV CODE 636 W HCPCS: Performed by: ANESTHESIOLOGY

## 2020-07-23 PROCEDURE — D9220A PRA ANESTHESIA: ICD-10-PCS | Mod: ,,, | Performed by: ANESTHESIOLOGY

## 2020-07-23 PROCEDURE — D9220A PRA ANESTHESIA: Mod: ,,, | Performed by: ANESTHESIOLOGY

## 2020-07-23 PROCEDURE — 88304 TISSUE EXAM BY PATHOLOGIST: CPT | Performed by: PATHOLOGY

## 2020-07-23 PROCEDURE — 71000033 HC RECOVERY, INTIAL HOUR: Performed by: SURGERY

## 2020-07-23 PROCEDURE — 10060 I&D ABSCESS SIMPLE/SINGLE: CPT | Mod: ,,, | Performed by: SURGERY

## 2020-07-23 PROCEDURE — U0002 COVID-19 LAB TEST NON-CDC: HCPCS

## 2020-07-23 PROCEDURE — 87077 CULTURE AEROBIC IDENTIFY: CPT

## 2020-07-23 PROCEDURE — 87075 CULTR BACTERIA EXCEPT BLOOD: CPT

## 2020-07-23 RX ORDER — EPHEDRINE SULFATE 50 MG/ML
INJECTION, SOLUTION INTRAVENOUS
Status: DISCONTINUED | OUTPATIENT
Start: 2020-07-23 | End: 2020-07-23

## 2020-07-23 RX ORDER — BUPIVACAINE HYDROCHLORIDE AND EPINEPHRINE 5; 5 MG/ML; UG/ML
INJECTION, SOLUTION EPIDURAL; INTRACAUDAL; PERINEURAL
Status: DISCONTINUED | OUTPATIENT
Start: 2020-07-23 | End: 2020-07-23 | Stop reason: HOSPADM

## 2020-07-23 RX ORDER — MIDAZOLAM HYDROCHLORIDE 1 MG/ML
2 INJECTION INTRAMUSCULAR; INTRAVENOUS ONCE
Status: COMPLETED | OUTPATIENT
Start: 2020-07-23 | End: 2020-07-23

## 2020-07-23 RX ORDER — SODIUM CHLORIDE, SODIUM LACTATE, POTASSIUM CHLORIDE, CALCIUM CHLORIDE 600; 310; 30; 20 MG/100ML; MG/100ML; MG/100ML; MG/100ML
125 INJECTION, SOLUTION INTRAVENOUS CONTINUOUS
Status: DISCONTINUED | OUTPATIENT
Start: 2020-07-23 | End: 2020-07-23 | Stop reason: HOSPADM

## 2020-07-23 RX ORDER — ONDANSETRON 2 MG/ML
INJECTION INTRAMUSCULAR; INTRAVENOUS
Status: DISCONTINUED | OUTPATIENT
Start: 2020-07-23 | End: 2020-07-23

## 2020-07-23 RX ORDER — SODIUM CHLORIDE, SODIUM LACTATE, POTASSIUM CHLORIDE, CALCIUM CHLORIDE 600; 310; 30; 20 MG/100ML; MG/100ML; MG/100ML; MG/100ML
INJECTION, SOLUTION INTRAVENOUS CONTINUOUS
Status: CANCELLED | OUTPATIENT
Start: 2020-07-23

## 2020-07-23 RX ORDER — MIDAZOLAM HYDROCHLORIDE 1 MG/ML
INJECTION, SOLUTION INTRAMUSCULAR; INTRAVENOUS
Status: DISCONTINUED | OUTPATIENT
Start: 2020-07-23 | End: 2020-07-23

## 2020-07-23 RX ORDER — CEFAZOLIN SODIUM 2 G/50ML
2 SOLUTION INTRAVENOUS
Status: COMPLETED | OUTPATIENT
Start: 2020-07-23 | End: 2020-07-23

## 2020-07-23 RX ORDER — MORPHINE SULFATE 2 MG/ML
2 INJECTION, SOLUTION INTRAMUSCULAR; INTRAVENOUS EVERY 5 MIN PRN
Status: DISCONTINUED | OUTPATIENT
Start: 2020-07-23 | End: 2020-07-23 | Stop reason: HOSPADM

## 2020-07-23 RX ORDER — PROPOFOL 10 MG/ML
VIAL (ML) INTRAVENOUS
Status: DISCONTINUED | OUTPATIENT
Start: 2020-07-23 | End: 2020-07-23

## 2020-07-23 RX ORDER — CEFAZOLIN SODIUM 2 G/50ML
SOLUTION INTRAVENOUS
Status: COMPLETED
Start: 2020-07-23 | End: 2020-07-23

## 2020-07-23 RX ORDER — OXYCODONE AND ACETAMINOPHEN 5; 325 MG/1; MG/1
1 TABLET ORAL EVERY 4 HOURS PRN
Qty: 30 TABLET | Refills: 0 | Status: SHIPPED | OUTPATIENT
Start: 2020-07-23 | End: 2020-08-02

## 2020-07-23 RX ORDER — OXYCODONE AND ACETAMINOPHEN 5; 325 MG/1; MG/1
1 TABLET ORAL
Status: DISCONTINUED | OUTPATIENT
Start: 2020-07-23 | End: 2020-07-23 | Stop reason: HOSPADM

## 2020-07-23 RX ORDER — ONDANSETRON 2 MG/ML
4 INJECTION INTRAMUSCULAR; INTRAVENOUS DAILY PRN
Status: DISCONTINUED | OUTPATIENT
Start: 2020-07-23 | End: 2020-07-23 | Stop reason: HOSPADM

## 2020-07-23 RX ORDER — LIDOCAINE HYDROCHLORIDE 10 MG/ML
1 INJECTION, SOLUTION EPIDURAL; INFILTRATION; INTRACAUDAL; PERINEURAL ONCE
Status: CANCELLED | OUTPATIENT
Start: 2020-07-23 | End: 2020-07-23

## 2020-07-23 RX ORDER — BUPIVACAINE HYDROCHLORIDE AND EPINEPHRINE 2.5; 5 MG/ML; UG/ML
INJECTION, SOLUTION EPIDURAL; INFILTRATION; INTRACAUDAL; PERINEURAL
Status: DISCONTINUED | OUTPATIENT
Start: 2020-07-23 | End: 2020-07-23 | Stop reason: HOSPADM

## 2020-07-23 RX ORDER — SODIUM CHLORIDE 9 MG/ML
INJECTION, SOLUTION INTRAVENOUS CONTINUOUS
Status: DISCONTINUED | OUTPATIENT
Start: 2020-07-23 | End: 2020-07-23 | Stop reason: HOSPADM

## 2020-07-23 RX ORDER — LIDOCAINE HYDROCHLORIDE 20 MG/ML
INJECTION INTRAVENOUS
Status: DISCONTINUED | OUTPATIENT
Start: 2020-07-23 | End: 2020-07-23

## 2020-07-23 RX ORDER — MEPERIDINE HYDROCHLORIDE 50 MG/ML
INJECTION INTRAMUSCULAR; INTRAVENOUS; SUBCUTANEOUS
Status: DISCONTINUED | OUTPATIENT
Start: 2020-07-23 | End: 2020-07-23

## 2020-07-23 RX ADMIN — MEPERIDINE HYDROCHLORIDE 25 MG: 50 INJECTION INTRAMUSCULAR; INTRAVENOUS; SUBCUTANEOUS at 09:07

## 2020-07-23 RX ADMIN — EPHEDRINE SULFATE 15 MG: 50 INJECTION INTRAVENOUS at 09:07

## 2020-07-23 RX ADMIN — PROPOFOL 30 MG: 10 INJECTION, EMULSION INTRAVENOUS at 09:07

## 2020-07-23 RX ADMIN — PROPOFOL 100 MG: 10 INJECTION, EMULSION INTRAVENOUS at 09:07

## 2020-07-23 RX ADMIN — CEFAZOLIN SODIUM 2 G: 2 SOLUTION INTRAVENOUS at 09:07

## 2020-07-23 RX ADMIN — MEPERIDINE HYDROCHLORIDE 50 MG: 50 INJECTION INTRAMUSCULAR; INTRAVENOUS; SUBCUTANEOUS at 09:07

## 2020-07-23 RX ADMIN — PROPOFOL 20 MG: 10 INJECTION, EMULSION INTRAVENOUS at 09:07

## 2020-07-23 RX ADMIN — LIDOCAINE HYDROCHLORIDE 100 MG: 20 INJECTION, SOLUTION INTRAVENOUS at 09:07

## 2020-07-23 RX ADMIN — EPHEDRINE SULFATE 10 MG: 50 INJECTION INTRAVENOUS at 09:07

## 2020-07-23 RX ADMIN — EPHEDRINE SULFATE 25 MG: 50 INJECTION INTRAVENOUS at 09:07

## 2020-07-23 RX ADMIN — SODIUM CHLORIDE: 0.9 INJECTION, SOLUTION INTRAVENOUS at 08:07

## 2020-07-23 RX ADMIN — MIDAZOLAM HYDROCHLORIDE 2 MG: 1 INJECTION, SOLUTION INTRAMUSCULAR; INTRAVENOUS at 09:07

## 2020-07-23 RX ADMIN — ONDANSETRON 4 MG: 2 INJECTION INTRAMUSCULAR; INTRAVENOUS at 09:07

## 2020-07-23 RX ADMIN — MIDAZOLAM HYDROCHLORIDE 2 MG: 2 INJECTION, SOLUTION INTRAMUSCULAR; INTRAVENOUS at 08:07

## 2020-07-23 NOTE — PLAN OF CARE
PT TUP TO BED TO GET DRESSED , ARIADNA ZAMORA ONTINUE TO MONITOR, WILL GO OVER DRESSING CHANGE WITH BOTH SONS IN CAR

## 2020-07-23 NOTE — DISCHARGE SUMMARY
Discharge Note        SUMMARY     Admit Date: 7/23/2020    Attending Physician: Marv Overton MD     Discharge Physician: Marv Overton MD    Discharge Date: 7/23/2020 9:54 AM      Hospital Course: Patient tolerated procedure well.     Disposition: Home or Self Care    Patient Instructions:   Current Discharge Medication List      START taking these medications    Details   oxyCODONE-acetaminophen (PERCOCET) 5-325 mg per tablet Take 1 tablet by mouth every 4 (four) hours as needed for Pain.  Qty: 30 tablet, Refills: 0    Comments: n/a          CONTINUE these medications which have NOT CHANGED    Details   valACYclovir (VALTREX) 1000 MG tablet Take 1 tablet (1,000 mg total) by mouth 3 (three) times daily.  Qty: 90 tablet, Refills: 3      vortioxetine (TRINTELLIX) 20 mg Tab Take 1 tablet (20 mg total) by mouth once daily.  Qty: 90 tablet, Refills: 3      clonazePAM (KLONOPIN) 0.5 MG tablet Take 1 tablet (0.5 mg total) by mouth 2 (two) times daily as needed for Anxiety.  Qty: 30 tablet, Refills: 0      gabapentin (NEURONTIN) 300 MG capsule Take 1 capsule (300 mg total) by mouth 3 (three) times daily. for 7 days  Qty: 21 capsule, Refills: 0      methocarbamol (ROBAXIN) 500 MG Tab Take 2 tablets (1,000 mg total) by mouth 3 (three) times daily.  Qty: 100 tablet, Refills: 0    Associated Diagnoses: Injury of calf      metroNIDAZOLE (FLAGYL) 500 MG tablet Take 1 tablet (500 mg total) by mouth every 8 (eight) hours. for 10 days  Qty: 30 tablet, Refills: 0    Associated Diagnoses: Abscess      ondansetron (ZOFRAN) 4 MG tablet Take 1 tablet (4 mg total) by mouth every 6 (six) hours as needed for Nausea.  Qty: 30 tablet, Refills: 0      oxyCODONE-acetaminophen (PERCOCET)  mg per tablet Take 1 tablet by mouth every 8 (eight) hours as needed for Pain.  Qty: 20 tablet, Refills: 0    Comments: n/a       sulfamethoxazole/trimethoprim (BACTRIM ORAL) Take by mouth.             Discharge Procedure Orders (must  include Diet, Follow-up, Activity):   Discharge Procedure Orders (must include Diet, Follow-up, Activity)   Diet general     Call MD for:  temperature >100.4     Call MD for:  persistent nausea and vomiting     Call MD for:  severe uncontrolled pain     Call MD for:  difficulty breathing, headache or visual disturbances     Call MD for:  redness, tenderness, or signs of infection (pain, swelling, redness, odor or green/yellow discharge around incision site)     Call MD for:  persistent dizziness or light-headedness     Wound care routine (specify)   Order Comments: Damp to dry dressing changes to left buttock wound daily.        Follow Up:  Follow up as scheduled.  Resume routine diet.  Activity as tolerated.    No driving day of procedure.

## 2020-07-23 NOTE — ANESTHESIA PREPROCEDURE EVALUATION
07/23/2020  Kristy Hernandez is a 44 y.o., female.    Anesthesia Evaluation    I have reviewed the Patient Summary Reports.    I have reviewed the Nursing Notes.    I have reviewed the Medications.     Review of Systems  Anesthesia Hx:  No problems with previous Anesthesia  Neg history of prior surgery. Denies Family Hx of Anesthesia complications.   Denies Personal Hx of Anesthesia complications.   Social:  Non-Smoker    Hematology/Oncology:  Hematology Normal   Oncology Normal     EENT/Dental:EENT/Dental Normal   Cardiovascular:  Cardiovascular Normal     Pulmonary:  Pulmonary Normal    Renal/:  Renal/ Normal     Hepatic/GI:  Hepatic/GI Normal    Musculoskeletal:  Musculoskeletal Normal    Neurological:  Neurology Normal    Endocrine:  Endocrine Normal    Dermatological:  Skin Normal    Psych:  Psychiatric Normal           Physical Exam  General:  Well nourished, Obesity    Airway/Jaw/Neck:  Airway Findings: Mouth Opening: Normal Tongue: Normal  General Airway Assessment: Adult  Mallampati: II  TM Distance: 4 - 6 cm        Eyes/Ears/Nose:  EYES/EARS/NOSE FINDINGS: Normal   Dental:  DENTAL FINDINGS: Normal   Chest/Lungs:  Chest/Lungs Clear    Heart/Vascular:  Heart Findings: Normal Heart murmur: negative Vascular Findings: Normal    Abdomen:  Abdomen Findings: Normal    Musculoskeletal:  Musculoskeletal Findings: Normal   Skin:  Skin Findings: Normal    Mental Status:  Mental Status Findings: Normal        Anesthesia Plan  Type of Anesthesia, risks & benefits discussed:  Anesthesia Type:  general  Patient's Preference:   Intra-op Monitoring Plan: standard ASA monitors  Intra-op Monitoring Plan Comments:   Post Op Pain Control Plan:   Post Op Pain Control Plan Comments:   Induction:   IV  Beta Blocker:  Patient is not currently on a Beta-Blocker (No further documentation required).       Informed  Consent: Patient understands risks and agrees with Anesthesia plan.  Questions answered. Anesthesia consent signed with patient.  ASA Score: 1     Day of Surgery Review of History & Physical: I have interviewed and examined the patient. I have reviewed the patient's H&P dated:    H&P update referred to the provider.         Ready For Surgery From Anesthesia Perspective.

## 2020-07-23 NOTE — DISCHARGE INSTRUCTIONS

## 2020-07-23 NOTE — OP NOTE
[facility] - Periop Services  Operative Note     SUMMARY     Surgery Date: 7/23/2020     Pre-op Diagnosis:  Abscess [L02.91]    Post-op Diagnosis:  Post-Op Diagnosis Codes:     * Abscess [L02.91]    Procedure(s) (LRB):  EXCISION, ABSCESS (Left) buttock    Surgeon(s) and Role:     * Marv Overton MD - Primary    Assistant: None    Antibiotics: Ancef 2 gram IV    Estimated Blood Loss: 5cc    Anesthesia:  General    Description of the findings of the procedure:  Chronic abscess cavity with chronic inflammation infection left buttock.  Excision 4 x 3 cm chronic abscess cavity left buttock.    Specimens:  Chronic abscess cavity left buttock.    Complications:  None apparent in the OR.    Implants:  Damp to dry dressing, only 1 dressing was damp.         Indications for Procedure:     Ms Hernandez is a 43yo F who was seen in clinic yesterday as referral for evaluation of left buttock abscess.  She underwent incision drainage procedure nearly a week ago.  Placed on Bactrim therapy.  Patient with previous left buttock abscess, in a different location, which grew out anaerobic species approximately 1 month ago.  Patient was seen in clinic.  She was offered continued non operative therapy versus operative excision.  After risk and benefits of both options, patient desired to proceed today with operative excision of a chronic infected left buttock abscess cavity.  Patient was informed of the need for dressing changes in the perioperative setting.    Procedure:     Patient seen in preoperative holding.  Questions were answered to the patient's satisfaction.  She signed informed consent.  She was brought back room placed on the table in the supine position.  She was then placed in a right-sided down lateral decubitus position.  Local anesthesia was administered per the anesthesia team, please see separate dictated anesthesia note for more details.  Additionally monitored anesthesia care was given per  the anesthesia team, please see separate dictated anesthesia note for more details.  Patient's left buttock was prepped and draped in standard sterile surgical fashion.  2 g IV Ancef was given prior to surgical incision.    I then placed 2 cultures within the patient's buttock abscess 1 for anaerobic and 1 for aerobic.  Decision at this time was to make a curvilinear incision around the previous incision drainage site.  This was done with 15 blade.  The abscess cavity was then completely excised from the surrounding skin and soft tissue with Bovie electric cautery.  Excision was 4 cm wide by 3 cm long.  Wound was then washed out with  irrigant.  Abscess cavity was then handed off as specimen for pathologic review.    Hemostasis was achieved ensured.  Kerlix was then used and damp with normal saline and placed inside the patient's wound.  Dry dressings were placed over top.  Patient tolerated procedure well she was reversed from anesthetic agent transfer back to postop care in stable condition.  Counts were correct at the end of procedure including lap pads instruments well as needles plan will be discharged home today with adequate pain medication follow-up surgery clinic in 2 weeks.  Daily damp to dry dressing changes indicated.  Patient currently on antibiotic therapy.

## 2020-07-23 NOTE — TRANSFER OF CARE
"Anesthesia Transfer of Care Note    Patient: Kristy Hernandez    Procedure(s) Performed: Procedure(s) (LRB):  EXCISION, ABSCESS (Left)    Patient location: PACU    Anesthesia Type: general    Transport from OR: Transported from OR on room air with adequate spontaneous ventilation    Post pain: adequate analgesia    Post assessment: no apparent anesthetic complications and tolerated procedure well    Post vital signs: stable    Level of consciousness: awake, alert and oriented    Nausea/Vomiting: no nausea/vomiting    Complications: none    Transfer of care protocol was followed      Last vitals:   Visit Vitals  /80 (BP Location: Right arm, Patient Position: Lying)   Pulse 85   Temp 37.1 °C (98.7 °F) (Oral)   Resp 19   Ht 5' 2" (1.575 m)   Wt 88 kg (194 lb)   SpO2 97%   Breastfeeding No   BMI 35.48 kg/m²     "

## 2020-07-23 NOTE — INTERVAL H&P NOTE
The patient has been examined and the H&P has been reviewed:    I concur with the findings and no changes have occurred since H&P was written.    Surgery risks, benefits and alternative options discussed and understood by patient/family.    Left buttock site marked in preop holding.          Active Hospital Problems    Diagnosis  POA    Abscess [L02.91]  Yes      Resolved Hospital Problems   No resolved problems to display.

## 2020-07-23 NOTE — ANESTHESIA POSTPROCEDURE EVALUATION
Anesthesia Post Evaluation    Patient: Kristy Hernandez    Procedure(s) Performed: Procedure(s) (LRB):  EXCISION, ABSCESS (Left)    Final Anesthesia Type: general    Patient location during evaluation: PACU  Patient participation: Yes- Able to Participate  Level of consciousness: awake and awake and alert  Post-procedure vital signs: reviewed and stable  Pain management: adequate  Airway patency: patent    PONV status at discharge: No PONV  Anesthetic complications: no      Cardiovascular status: blood pressure returned to baseline  Respiratory status: unassisted and spontaneous ventilation  Hydration status: euvolemic  Follow-up not needed.          Vitals Value Taken Time   /53 07/23/20 1046   Temp 36.5 °C (97.7 °F) 07/23/20 0958   Pulse 74 07/23/20 1045   Resp 9 07/23/20 1045   SpO2 98 % 07/23/20 1045   Vitals shown include unvalidated device data.      Event Time   Out of Recovery 10:30:00         Pain/Delfin Score: Delfin Score: 10 (7/23/2020 10:30 AM)  Modified Delfin Score: 19 (7/23/2020 10:45 AM)

## 2020-07-27 LAB — BACTERIA SPEC AEROBE CULT: ABNORMAL

## 2020-07-28 LAB
BACTERIA SPEC ANAEROBE CULT: NORMAL
BACTERIA SPEC ANAEROBE CULT: NORMAL

## 2020-07-29 LAB
FINAL PATHOLOGIC DIAGNOSIS: NORMAL
GROSS: NORMAL

## 2020-08-07 ENCOUNTER — OFFICE VISIT (OUTPATIENT)
Dept: SURGERY | Facility: CLINIC | Age: 45
End: 2020-08-07
Payer: COMMERCIAL

## 2020-08-07 VITALS
HEIGHT: 62 IN | OXYGEN SATURATION: 97 % | HEART RATE: 87 BPM | TEMPERATURE: 97 F | BODY MASS INDEX: 36.52 KG/M2 | SYSTOLIC BLOOD PRESSURE: 112 MMHG | DIASTOLIC BLOOD PRESSURE: 74 MMHG | WEIGHT: 198.44 LBS

## 2020-08-07 DIAGNOSIS — Z09 POSTOP CHECK: Primary | ICD-10-CM

## 2020-08-07 PROCEDURE — 99024 PR POST-OP FOLLOW-UP VISIT: ICD-10-PCS | Mod: S$GLB,,, | Performed by: SURGERY

## 2020-08-07 PROCEDURE — 99024 POSTOP FOLLOW-UP VISIT: CPT | Mod: S$GLB,,, | Performed by: SURGERY

## 2020-08-07 NOTE — PROGRESS NOTES
"  General Surgery  [facility]  Follow-up    HPI/Follow-up exam:  Kristy Hernandez is a 44 y.o. female Presents today for follow-up examination of excision of left buttock abscess.  Patient doing well today.  No apparent postsurgical issues.  Cultures reviewed.  MRSA sensitive actor.  No new issues or complaints.  Wound dressing changes on going.    PHYSICAL EXAM:  /74   Pulse 87   Temp 97.1 °F (36.2 °C) (Tympanic)   Ht 5' 2" (1.575 m)   Wt 90 kg (198 lb 6.6 oz)   SpO2 97%   BMI 36.29 kg/m²   Gen: Wd Wn female in NAD  Heent: Nc/At, MMM  Cv: RRR  Lung: Non-labored breathing, clear bilaterally  Abd: Soft, non-tender, non-distended  Ext: No cyanosis clubbing or edema  Buttock:   Surgical site clean dry intact.  Healthy pink granulation tissue in base of wound.    Assessment:  Kristy Hernandez is a 44 y.o. female s/p    Excision abscess.    Plan/Medical Decision Making:    Doing well.  No apparent postsurgical issues.  Follow-up surgery clinic 3-4 weeks.  Continue dressing changes.    Followup:   3-4 weeks.    Patient instructed that best way to communicate with my office staff is for patient to get on the [facility] patient portal to expedite communication and communication issues that may occur.  Patient was given instructions on how to get on the portal.  I encouraged patient to obtain portal access as well.  Ultimately it is up to the patient to obtain access.  Patient voiced understanding.        "

## 2020-08-19 DIAGNOSIS — Z01.84 ANTIBODY RESPONSE EXAMINATION: ICD-10-CM

## 2020-08-27 ENCOUNTER — PATIENT OUTREACH (OUTPATIENT)
Dept: ADMINISTRATIVE | Facility: OTHER | Age: 45
End: 2020-08-27

## 2020-08-27 NOTE — PROGRESS NOTES
Health Maintenance Due   Topic Date Due    Hepatitis C Screening  1975    HIV Screening  08/15/1989    TETANUS VACCINE  08/15/1992     Updates were requested from care everywhere.  Chart was reviewed for overdue Proactive [facility] Encounters ([facility]) topics (CRS, Breast Cancer Screening, Eye exam)  Health Maintenance has been updated.  LINKS immunization registry triggered.  Immunizations were reconciled.

## 2020-09-02 ENCOUNTER — OFFICE VISIT (OUTPATIENT)
Dept: SURGERY | Facility: CLINIC | Age: 45
End: 2020-09-02
Payer: COMMERCIAL

## 2020-09-02 VITALS
WEIGHT: 193.13 LBS | HEART RATE: 106 BPM | HEIGHT: 62 IN | TEMPERATURE: 98 F | DIASTOLIC BLOOD PRESSURE: 98 MMHG | BODY MASS INDEX: 35.54 KG/M2 | OXYGEN SATURATION: 97 % | SYSTOLIC BLOOD PRESSURE: 139 MMHG

## 2020-09-02 DIAGNOSIS — Z09 POSTOP CHECK: Primary | ICD-10-CM

## 2020-09-02 PROCEDURE — 99024 POSTOP FOLLOW-UP VISIT: CPT | Mod: S$GLB,,, | Performed by: SURGERY

## 2020-09-02 PROCEDURE — 99024 PR POST-OP FOLLOW-UP VISIT: ICD-10-PCS | Mod: S$GLB,,, | Performed by: SURGERY

## 2020-09-02 NOTE — PROGRESS NOTES
"General Surgery  [facility]  Follow-up    HPI/Follow-up exam:  Kristy Hernandez is a 45 y.o. female presents today for follow-up examination after left buttock abscess excision drainage.  Wound is now completely heal by secondary intention.  No apparent post procedural issues.  There is a significant scar in place from wound healing by secondary intention.  Patient may need a scar revision at some point.    PHYSICAL EXAM:  BP (!) 139/98   Pulse 106   Temp 97.6 °F (36.4 °C) (Tympanic)   Ht 5' 2" (1.575 m)   Wt 87.6 kg (193 lb 2 oz)   SpO2 97%   BMI 35.32 kg/m²   Gen: Wd Wn female in NAD  Heent: Nc/At, MMM  Cv: RRR  Lung: Non-labored breathing, clear bilaterally  Abd: Soft, non-tender, non-distended  Ext: No cyanosis clubbing or edema  My Nurse jose l was present during evaluation.  Significant scar left buttock previous surgical wound.  Completely healed over and epithelialized.    Assessment:  Kristy Hernandez is a 45 y.o. female s/p excision abscess and drainage left buttock with wound heal by secondary intention.    Plan/Medical Decision Making:  Doing well.  Scar in place.  May need scar revision at some point however would not entertain scar revision at this point.  Remodeling still ongoing.  Would recommend scar revision if no improvement in 3-6 months.    Followup:  3-6 months of no improvement with scar.    Patient instructed that best way to communicate with my office staff is for patient to get on the [facility] patient portal to expedite communication and communication issues that may occur.  Patient was given instructions on how to get on the portal.  I encouraged patient to obtain portal access as well.  Ultimately it is up to the patient to obtain access.  Patient voiced understanding.          "

## 2020-09-18 DIAGNOSIS — Z01.84 ANTIBODY RESPONSE EXAMINATION: ICD-10-CM

## 2020-09-21 NOTE — TELEPHONE ENCOUNTER
Called patient regarding shingles flare on face. Responding to valtrex, no eye involvement, patient with continue to monitor and update clinic.

## 2020-09-28 ENCOUNTER — TELEPHONE (OUTPATIENT)
Dept: FAMILY MEDICINE | Facility: CLINIC | Age: 45
End: 2020-09-28

## 2020-09-28 NOTE — TELEPHONE ENCOUNTER
Pt states she has taken Valacyclovir daily and up to 3 times per day with outbreaks for years. States she recently had shingles and has been taking it TID, and not she's getting lesions around her mouth.  Pt is asking if you thing changes need to be made to her   meds to help reduce the chance of breakouts.    Pt also asks when she should get the Shingles vaccine- informed pt that [facility] does not offer this, and can be completed in [place] or at her pharmacy when ordered. Pt voiced understanding.    Please advise, thank you.

## 2020-09-29 NOTE — TELEPHONE ENCOUNTER
The vaccine would be best next step to prevent these outbreaks if she is still having outbreaks with suppressive therapy.

## 2020-10-09 ENCOUNTER — OFFICE VISIT (OUTPATIENT)
Dept: FAMILY MEDICINE | Facility: CLINIC | Age: 45
End: 2020-10-09
Payer: COMMERCIAL

## 2020-10-09 VITALS
SYSTOLIC BLOOD PRESSURE: 139 MMHG | HEART RATE: 92 BPM | BODY MASS INDEX: 36.14 KG/M2 | DIASTOLIC BLOOD PRESSURE: 88 MMHG | TEMPERATURE: 98 F | OXYGEN SATURATION: 97 % | RESPIRATION RATE: 16 BRPM | WEIGHT: 196.38 LBS | HEIGHT: 62 IN

## 2020-10-09 DIAGNOSIS — F32.89 OTHER DEPRESSION: Primary | ICD-10-CM

## 2020-10-09 DIAGNOSIS — B02.23 SHINGLES (HERPES ZOSTER) POLYNEUROPATHY: ICD-10-CM

## 2020-10-09 PROCEDURE — 99999 PR PBB SHADOW E&M-EST. PATIENT-LVL III: ICD-10-PCS | Mod: PBBFAC,,, | Performed by: FAMILY MEDICINE

## 2020-10-09 PROCEDURE — 3008F BODY MASS INDEX DOCD: CPT | Mod: S$GLB,,, | Performed by: FAMILY MEDICINE

## 2020-10-09 PROCEDURE — 99213 PR OFFICE/OUTPT VISIT, EST, LEVL III, 20-29 MIN: ICD-10-PCS | Mod: S$GLB,,, | Performed by: FAMILY MEDICINE

## 2020-10-09 PROCEDURE — 99999 PR PBB SHADOW E&M-EST. PATIENT-LVL III: CPT | Mod: PBBFAC,,, | Performed by: FAMILY MEDICINE

## 2020-10-09 PROCEDURE — 3008F PR BODY MASS INDEX (BMI) DOCUMENTED: ICD-10-PCS | Mod: S$GLB,,, | Performed by: FAMILY MEDICINE

## 2020-10-09 PROCEDURE — 99213 OFFICE O/P EST LOW 20 MIN: CPT | Mod: S$GLB,,, | Performed by: FAMILY MEDICINE

## 2020-10-09 RX ORDER — BUPROPION HYDROCHLORIDE 150 MG/1
150 TABLET ORAL DAILY
Qty: 90 TABLET | Refills: 3 | Status: SHIPPED | OUTPATIENT
Start: 2020-10-09 | End: 2021-03-29 | Stop reason: SDUPTHER

## 2020-10-09 RX ORDER — ZOSTER VACCINE RECOMBINANT, ADJUVANTED 50 MCG/0.5
0.5 KIT INTRAMUSCULAR ONCE
Qty: 1 EACH | Refills: 0 | Status: SHIPPED | OUTPATIENT
Start: 2020-10-09 | End: 2020-10-09

## 2020-10-09 NOTE — PROGRESS NOTES
"Subjective:       Patient ID: Kristy Hernandez is a 45 y.o. female.    Chief Complaint: Follow-up    Severely depressed  No si/hi  Taking trintellix with some relief, although not work at maximum efficacy  Frequent crying episodes  anhedonia    Review of Systems   Constitutional: Negative for activity change, appetite change, chills, fatigue and fever.   HENT: Negative for congestion, dental problem, facial swelling, nosebleeds, postnasal drip, sinus pain, sore throat, trouble swallowing and voice change.    Eyes: Negative for pain, discharge and visual disturbance.   Respiratory: Negative for apnea, cough, chest tightness and shortness of breath.    Cardiovascular: Negative for chest pain and palpitations.   Gastrointestinal: Negative for abdominal pain, blood in stool, constipation and nausea.   Endocrine: Negative for cold intolerance, polydipsia and polyuria.   Genitourinary: Negative for difficulty urinating, enuresis and flank pain.   Musculoskeletal: Negative for arthralgias and back pain.   Skin: Positive for rash. Negative for color change.   Allergic/Immunologic: Negative for environmental allergies and immunocompromised state.   Neurological: Negative for dizziness and light-headedness.   Hematological: Negative for adenopathy.   Psychiatric/Behavioral: Positive for dysphoric mood. Negative for agitation, behavioral problems and decreased concentration. The patient is not nervous/anxious.    All other systems reviewed and are negative.        Reviewed family, medical, surgical, and social history.    Objective:      /88 (BP Location: Left arm, Patient Position: Sitting, BP Method: Medium (Automatic))   Pulse 92   Temp 98.2 °F (36.8 °C) (Oral)   Resp 16   Ht 5' 2" (1.575 m)   Wt 89.1 kg (196 lb 6.4 oz)   SpO2 97%   BMI 35.92 kg/m²   Physical Exam  Vitals signs and nursing note reviewed.   Constitutional:       General: She is not in acute distress.     Appearance: She is well-developed. She " is not diaphoretic.   HENT:      Head: Normocephalic and atraumatic.      Nose: Nose normal.      Mouth/Throat:      Pharynx: No oropharyngeal exudate.   Eyes:      General: No scleral icterus.     Conjunctiva/sclera: Conjunctivae normal.      Pupils: Pupils are equal, round, and reactive to light.   Neck:      Musculoskeletal: Normal range of motion and neck supple.      Thyroid: No thyromegaly.   Cardiovascular:      Rate and Rhythm: Normal rate and regular rhythm.      Heart sounds: Normal heart sounds. No murmur. No friction rub. No gallop.    Pulmonary:      Effort: Pulmonary effort is normal. No respiratory distress.      Breath sounds: Normal breath sounds. No wheezing or rales.   Chest:      Chest wall: No tenderness.   Abdominal:      General: Bowel sounds are normal. There is no distension.      Palpations: Abdomen is soft.      Tenderness: There is no abdominal tenderness. There is no guarding.   Musculoskeletal: Normal range of motion.         General: No tenderness or deformity.   Lymphadenopathy:      Cervical: No cervical adenopathy.   Skin:     General: Skin is warm and dry.      Coloration: Skin is not pale.      Findings: No erythema or rash.   Neurological:      Mental Status: She is alert and oriented to person, place, and time.      Cranial Nerves: No cranial nerve deficit.      Sensory: No sensory deficit.      Motor: No abnormal muscle tone.      Deep Tendon Reflexes: Reflexes normal.   Psychiatric:         Behavior: Behavior normal.         Thought Content: Thought content normal.         Judgment: Judgment normal.         Assessment:       1. Other depression        Plan:       Other depression  -     buPROPion (WELLBUTRIN XL) 150 MG TB24 tablet; Take 1 tablet (150 mg total) by mouth once daily.  Dispense: 90 tablet; Refill: 3            Risks, benefits, and side effects were discussed with the patient. All questions were answered to the fullest satisfaction of the patient, and pt verbalized  understanding and agreement to treatment plan. Pt was to call with any new or worsening symptoms, or present to the ER.

## 2020-10-18 DIAGNOSIS — Z01.84 ANTIBODY RESPONSE EXAMINATION: ICD-10-CM

## 2020-10-26 PROBLEM — U07.1 COVID-19 VIRUS INFECTION: Status: ACTIVE | Noted: 2020-10-26

## 2020-11-17 DIAGNOSIS — Z01.84 ANTIBODY RESPONSE EXAMINATION: ICD-10-CM

## 2020-12-10 ENCOUNTER — PATIENT MESSAGE (OUTPATIENT)
Dept: FAMILY MEDICINE | Facility: CLINIC | Age: 45
End: 2020-12-10

## 2020-12-10 DIAGNOSIS — Z12.31 OTHER SCREENING MAMMOGRAM: ICD-10-CM

## 2020-12-11 ENCOUNTER — OFFICE VISIT (OUTPATIENT)
Dept: SURGERY | Facility: CLINIC | Age: 45
End: 2020-12-11
Payer: COMMERCIAL

## 2020-12-11 VITALS
RESPIRATION RATE: 16 BRPM | DIASTOLIC BLOOD PRESSURE: 86 MMHG | HEIGHT: 62 IN | WEIGHT: 196 LBS | HEART RATE: 81 BPM | TEMPERATURE: 98 F | OXYGEN SATURATION: 97 % | SYSTOLIC BLOOD PRESSURE: 140 MMHG | BODY MASS INDEX: 36.07 KG/M2

## 2020-12-11 DIAGNOSIS — L73.9 FOLLICULITIS: Primary | ICD-10-CM

## 2020-12-11 PROCEDURE — 1125F PR PAIN SEVERITY QUANTIFIED, PAIN PRESENT: ICD-10-PCS | Mod: S$GLB,,, | Performed by: SURGERY

## 2020-12-11 PROCEDURE — 1125F AMNT PAIN NOTED PAIN PRSNT: CPT | Mod: S$GLB,,, | Performed by: SURGERY

## 2020-12-11 PROCEDURE — 3008F BODY MASS INDEX DOCD: CPT | Mod: S$GLB,,, | Performed by: SURGERY

## 2020-12-11 PROCEDURE — 99213 PR OFFICE/OUTPT VISIT, EST, LEVL III, 20-29 MIN: ICD-10-PCS | Mod: S$GLB,,, | Performed by: SURGERY

## 2020-12-11 PROCEDURE — 3008F PR BODY MASS INDEX (BMI) DOCUMENTED: ICD-10-PCS | Mod: S$GLB,,, | Performed by: SURGERY

## 2020-12-11 PROCEDURE — 99213 OFFICE O/P EST LOW 20 MIN: CPT | Mod: S$GLB,,, | Performed by: SURGERY

## 2020-12-11 RX ORDER — SULFAMETHOXAZOLE AND TRIMETHOPRIM 800; 160 MG/1; MG/1
1 TABLET ORAL 2 TIMES DAILY
Qty: 28 TABLET | Refills: 0 | Status: SHIPPED | OUTPATIENT
Start: 2020-12-11 | End: 2021-04-14

## 2020-12-11 NOTE — PROGRESS NOTES
Subjective:       Patient ID: Kristy Hernandez is a 45 y.o. female.    Chief Complaint: Consult (Abscess)      HPI:  Ms. Hernandez presents today with a tender mass the medial aspect of her left buttock.  This was the site of a previous abscess that was drained surgically 5 months ago.  No fever or chills.  No recent trauma.  No noted triggering issues.  No aggravating factors other than palpation and sitting.  No alleviating factors.  No other associated symptoms.  No redness, swelling, drainage, odor, etc.      Allergies & Meds:  Review of patient's allergies indicates:  No Known Allergies    Current Outpatient Medications   Medication Sig Dispense Refill    buPROPion (WELLBUTRIN XL) 150 MG TB24 tablet Take 1 tablet (150 mg total) by mouth once daily. 90 tablet 3    hydrOXYchloroQUINE (PLAQUENIL) 200 mg tablet Take 1 tablet (200 mg total) by mouth 2 (two) times daily. 20 tablet 0    lopinavir-ritonavir 200-50 mg (KALETRA) 200-50 mg Tab Take 2 tablets by mouth 2 (two) times daily. 20 tablet 0    methocarbamol (ROBAXIN) 500 MG Tab Take 2 tablets (1,000 mg total) by mouth 3 (three) times daily. (Patient taking differently: Take 1,000 mg by mouth 3 (three) times daily as needed. ) 100 tablet 0    ondansetron (ZOFRAN) 4 MG tablet Take 1 tablet (4 mg total) by mouth every 6 (six) hours as needed for Nausea. 30 tablet 0    valACYclovir (VALTREX) 1000 MG tablet Take 1 tablet (1,000 mg total) by mouth 3 (three) times daily. 90 tablet 3    vortioxetine (TRINTELLIX) 20 mg Tab Take 1 tablet (20 mg total) by mouth once daily. 90 tablet 3    zinc gluconate 30 mg Tab Take 30 mg by mouth 2 (two) times a day. 60 each 1    clonazePAM (KLONOPIN) 0.5 MG tablet Take 1 tablet (0.5 mg total) by mouth 2 (two) times daily as needed for Anxiety. 30 tablet 0    gabapentin (NEURONTIN) 300 MG capsule Take 1 capsule (300 mg total) by mouth 3 (three) times daily. for 7 days 21 capsule 0    sulfamethoxazole-trimethoprim 800-160mg  (BACTRIM DS) 800-160 mg Tab Take 1 tablet by mouth 2 (two) times daily. 28 tablet 0     No current facility-administered medications for this visit.        PMFSHx:    Past Medical History:   Diagnosis Date    Anxiety     Chronic back pain     Depression     Herpes     Obesity        Past Surgical History:   Procedure Laterality Date    BREAST SURGERY      reduction     SECTION      deque      dequervein R wrist    ENDOMETRIAL ABLATION      HYSTERECTOMY      INCISION AND DRAINAGE OF ABSCESS Left 2020    Procedure: INCISION AND DRAINAGE, ABSCESS;  Surgeon: Joshua Guillermo MD;  Location: [facility] OR;  Service: General;  Laterality: Left;    medial branch nerve block       RADIOFREQUENCY ABLATION OF LUMBAR MEDIAL BRANCH NERVE AT SINGLE LEVEL      REDUCTION OF BOTH BREASTS Bilateral 3/13/2019    Procedure: MAMMOPLASTY, REDUCTION, BILATERAL;  Surgeon: Ghanshyam Forte MD;  Location: [facility] OR;  Service: Plastics;  Laterality: Bilateral;    SURGICAL REMOVAL OF ABSCESS Left 2020    Procedure: EXCISION, ABSCESS;  Surgeon: Marv Overton MD;  Location: [facility] OR;  Service: General;  Laterality: Left;  buttock    TONSILLECTOMY      TUBAL LIGATION         Family History   Problem Relation Age of Onset    Pancreatic cancer Father     Breast cancer Paternal Aunt     Breast cancer Paternal Grandmother     Breast cancer Paternal Cousin        Social History     Tobacco Use    Smoking status: Former Smoker     Quit date:      Years since quittin.9    Smokeless tobacco: Never Used   Substance Use Topics    Alcohol use: Yes     Comment: socially    Drug use: Never         Review of Systems   Constitutional: Negative for appetite change, chills, fatigue, fever and unexpected weight change.   HENT: Negative for congestion, dental problem, ear pain, mouth sores, postnasal drip, rhinorrhea, sore throat, tinnitus, trouble swallowing and voice change.    Eyes: Negative for  photophobia, pain, discharge and visual disturbance.   Respiratory: Negative for cough, chest tightness, shortness of breath and wheezing.    Cardiovascular: Negative for chest pain, palpitations and leg swelling.   Gastrointestinal: Negative for abdominal pain, blood in stool, constipation, diarrhea, nausea and vomiting.   Endocrine: Negative for cold intolerance, heat intolerance, polydipsia, polyphagia and polyuria.   Genitourinary: Negative for difficulty urinating, dysuria, flank pain, frequency, hematuria and urgency.   Musculoskeletal: Negative for arthralgias, joint swelling and myalgias.   Skin: Negative for color change and rash.   Allergic/Immunologic: Negative for immunocompromised state.   Neurological: Negative for dizziness, tremors, seizures, syncope, speech difficulty, weakness, numbness and headaches.   Hematological: Negative for adenopathy. Does not bruise/bleed easily.   Psychiatric/Behavioral: Negative for agitation, confusion, hallucinations, self-injury and suicidal ideas. The patient is not nervous/anxious.        Objective:      Physical Exam  Constitutional:       General: She is not in acute distress.     Appearance: Normal appearance. She is well-developed. She is not ill-appearing or toxic-appearing.      Comments: Body mass index is 35.85 kg/m².   HENT:      Head: Normocephalic and atraumatic.      Right Ear: Hearing and external ear normal.      Left Ear: Hearing and external ear normal.      Nose: Nose normal.   Eyes:      General: Lids are normal. No scleral icterus.     Conjunctiva/sclera: Conjunctivae normal.   Neck:      Musculoskeletal: Normal range of motion and neck supple.      Thyroid: No thyroid mass or thyromegaly.      Vascular: No carotid bruit or JVD.      Trachea: Trachea and phonation normal.   Cardiovascular:      Rate and Rhythm: Normal rate and regular rhythm.      Chest Wall: PMI is not displaced.      Heart sounds: Normal heart sounds. No murmur. No gallop.     Pulmonary:      Effort: Pulmonary effort is normal. No tachypnea, accessory muscle usage or respiratory distress.      Breath sounds: Normal breath sounds.   Abdominal:      General: Bowel sounds are normal.      Palpations: Abdomen is soft.      Tenderness: There is no abdominal tenderness.      Hernia: No hernia is present.   Lymphadenopathy:      Cervical: No cervical adenopathy.      Upper Body:      Right upper body: No supraclavicular adenopathy.      Left upper body: No supraclavicular adenopathy.   Skin:     General: Skin is warm and dry.      Findings: No rash.      Nails: There is no clubbing.     Neurological:      Mental Status: She is alert and oriented to person, place, and time. She is not disoriented.      GCS: GCS eye subscore is 4. GCS verbal subscore is 5. GCS motor subscore is 6.   Psychiatric:         Attention and Perception: She is attentive.         Speech: Speech normal.         Behavior: Behavior normal.         Thought Content: Thought content normal.         Judgment: Judgment normal.           Medical Records Review:  Cultures from previous abscess that was drained in July confirmed MRSA sensitive to Bactrim.      Assessment:       Folliculitis.  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  No evidence of an abscess or any indication for surgical intervention.    1. Folliculitis          Plan:     Folliculitis    Other orders  -     sulfamethoxazole-trimethoprim 800-160mg (BACTRIM DS) 800-160 mg Tab; Take 1 tablet by mouth 2 (two) times daily.  Dispense: 28 tablet; Refill: 0          Follow up in about 3 days (around 12/14/2020).

## 2021-01-04 ENCOUNTER — PATIENT MESSAGE (OUTPATIENT)
Dept: ADMINISTRATIVE | Facility: HOSPITAL | Age: 46
End: 2021-01-04

## 2021-02-18 ENCOUNTER — HOSPITAL ENCOUNTER (OUTPATIENT)
Dept: RADIOLOGY | Facility: HOSPITAL | Age: 46
Discharge: HOME | End: 2021-02-18
Attending: FAMILY MEDICINE
Payer: COMMERCIAL

## 2021-02-18 VITALS — HEIGHT: 62 IN | WEIGHT: 196 LBS | BODY MASS INDEX: 36.07 KG/M2

## 2021-02-18 DIAGNOSIS — Z12.31 OTHER SCREENING MAMMOGRAM: ICD-10-CM

## 2021-02-18 PROCEDURE — 77067 MAMMO DIGITAL SCREENING BILAT WITH TOMO: ICD-10-PCS | Mod: 26,,, | Performed by: RADIOLOGY

## 2021-02-18 PROCEDURE — 77063 BREAST TOMOSYNTHESIS BI: CPT | Mod: 26,,, | Performed by: RADIOLOGY

## 2021-02-18 PROCEDURE — 77067 SCR MAMMO BI INCL CAD: CPT | Mod: 26,,, | Performed by: RADIOLOGY

## 2021-02-18 PROCEDURE — 77063 MAMMO DIGITAL SCREENING BILAT WITH TOMO: ICD-10-PCS | Mod: 26,,, | Performed by: RADIOLOGY

## 2021-02-18 PROCEDURE — 77067 SCR MAMMO BI INCL CAD: CPT | Mod: TC

## 2021-02-22 ENCOUNTER — PATIENT MESSAGE (OUTPATIENT)
Dept: FAMILY MEDICINE | Facility: CLINIC | Age: 46
End: 2021-02-22

## 2021-02-22 DIAGNOSIS — R92.8 ABNORMAL MAMMOGRAM: Primary | ICD-10-CM

## 2021-03-04 DIAGNOSIS — Z11.59 NEED FOR HEPATITIS C SCREENING TEST: ICD-10-CM

## 2021-03-11 ENCOUNTER — HOSPITAL ENCOUNTER (OUTPATIENT)
Dept: RADIOLOGY | Facility: HOSPITAL | Age: 46
Discharge: HOME | End: 2021-03-11
Attending: FAMILY MEDICINE
Payer: COMMERCIAL

## 2021-03-11 DIAGNOSIS — R92.8 ABNORMAL MAMMOGRAM: ICD-10-CM

## 2021-03-11 PROCEDURE — 77065 MAMMO DIGITAL DIAGNOSTIC LEFT WITH TOMO: ICD-10-PCS | Mod: 26,LT,, | Performed by: RADIOLOGY

## 2021-03-11 PROCEDURE — 77061 MAMMO DIGITAL DIAGNOSTIC LEFT WITH TOMO: ICD-10-PCS | Mod: 26,LT,, | Performed by: RADIOLOGY

## 2021-03-11 PROCEDURE — 76642 US BREAST LEFT LIMITED: ICD-10-PCS | Mod: 26,LT,, | Performed by: RADIOLOGY

## 2021-03-11 PROCEDURE — 77065 DX MAMMO INCL CAD UNI: CPT | Mod: 26,LT,, | Performed by: RADIOLOGY

## 2021-03-11 PROCEDURE — 76642 ULTRASOUND BREAST LIMITED: CPT | Mod: TC,LT

## 2021-03-11 PROCEDURE — 77061 BREAST TOMOSYNTHESIS UNI: CPT | Mod: TC,LT

## 2021-03-11 PROCEDURE — 76642 ULTRASOUND BREAST LIMITED: CPT | Mod: 26,LT,, | Performed by: RADIOLOGY

## 2021-03-11 PROCEDURE — 77061 BREAST TOMOSYNTHESIS UNI: CPT | Mod: 26,LT,, | Performed by: RADIOLOGY

## 2021-03-29 DIAGNOSIS — F32.89 OTHER DEPRESSION: ICD-10-CM

## 2021-03-29 RX ORDER — VORTIOXETINE 20 MG/1
1 TABLET, FILM COATED ORAL DAILY
Qty: 90 TABLET | Refills: 3 | Status: SHIPPED | OUTPATIENT
Start: 2021-03-29

## 2021-03-29 RX ORDER — VORTIOXETINE 20 MG/1
1 TABLET, FILM COATED ORAL DAILY
Qty: 90 TABLET | Refills: 3 | OUTPATIENT
Start: 2021-03-29 | End: 2021-03-29 | Stop reason: SDUPTHER

## 2021-03-29 RX ORDER — VALACYCLOVIR HYDROCHLORIDE 1 G/1
1000 TABLET, FILM COATED ORAL 3 TIMES DAILY
Qty: 90 TABLET | Refills: 3 | Status: SHIPPED | OUTPATIENT
Start: 2021-03-29 | End: 2021-12-29

## 2021-03-29 RX ORDER — BUPROPION HYDROCHLORIDE 150 MG/1
150 TABLET ORAL DAILY
Qty: 90 TABLET | Refills: 3 | Status: SHIPPED | OUTPATIENT
Start: 2021-03-29 | End: 2022-03-15

## 2021-04-14 ENCOUNTER — LAB VISIT (OUTPATIENT)
Dept: LAB | Facility: HOSPITAL | Age: 46
End: 2021-04-14
Attending: FAMILY MEDICINE
Payer: COMMERCIAL

## 2021-04-14 ENCOUNTER — TELEPHONE (OUTPATIENT)
Dept: FAMILY MEDICINE | Facility: CLINIC | Age: 46
End: 2021-04-14

## 2021-04-14 ENCOUNTER — OFFICE VISIT (OUTPATIENT)
Dept: FAMILY MEDICINE | Facility: CLINIC | Age: 46
End: 2021-04-14
Payer: COMMERCIAL

## 2021-04-14 VITALS
WEIGHT: 200.38 LBS | SYSTOLIC BLOOD PRESSURE: 122 MMHG | OXYGEN SATURATION: 98 % | HEIGHT: 62 IN | BODY MASS INDEX: 36.87 KG/M2 | DIASTOLIC BLOOD PRESSURE: 84 MMHG | HEART RATE: 72 BPM | RESPIRATION RATE: 16 BRPM

## 2021-04-14 DIAGNOSIS — Z00.00 ANNUAL PHYSICAL EXAM: ICD-10-CM

## 2021-04-14 DIAGNOSIS — Z86.19 HISTORY OF SHINGLES: ICD-10-CM

## 2021-04-14 DIAGNOSIS — Z11.59 NEED FOR HEPATITIS C SCREENING TEST: ICD-10-CM

## 2021-04-14 DIAGNOSIS — Z00.00 ANNUAL PHYSICAL EXAM: Primary | ICD-10-CM

## 2021-04-14 LAB
25(OH)D3+25(OH)D2 SERPL-MCNC: 20 NG/ML (ref 30–96)
ALBUMIN SERPL BCP-MCNC: 4.5 G/DL (ref 3.5–5.2)
ALP SERPL-CCNC: 70 U/L (ref 55–135)
ALT SERPL W/O P-5'-P-CCNC: 25 U/L (ref 10–44)
ANION GAP SERPL CALC-SCNC: 8 MMOL/L (ref 8–16)
AST SERPL-CCNC: 22 U/L (ref 10–40)
BASOPHILS # BLD AUTO: 0.07 K/UL (ref 0–0.2)
BASOPHILS NFR BLD: 0.9 % (ref 0–1.9)
BILIRUB SERPL-MCNC: 0.6 MG/DL (ref 0.1–1)
BUN SERPL-MCNC: 19 MG/DL (ref 6–20)
CALCIUM SERPL-MCNC: 9.6 MG/DL (ref 8.7–10.5)
CHLORIDE SERPL-SCNC: 108 MMOL/L (ref 95–110)
CHOLEST SERPL-MCNC: 263 MG/DL (ref 120–199)
CHOLEST/HDLC SERPL: 4.9 {RATIO} (ref 2–5)
CO2 SERPL-SCNC: 27 MMOL/L (ref 23–29)
CREAT SERPL-MCNC: 0.9 MG/DL (ref 0.5–1.4)
DIFFERENTIAL METHOD: NORMAL
EOSINOPHIL # BLD AUTO: 0.2 K/UL (ref 0–0.5)
EOSINOPHIL NFR BLD: 2.4 % (ref 0–8)
ERYTHROCYTE [DISTWIDTH] IN BLOOD BY AUTOMATED COUNT: 13.6 % (ref 11.5–14.5)
EST. GFR  (AFRICAN AMERICAN): >60 ML/MIN/1.73 M^2
EST. GFR  (NON AFRICAN AMERICAN): >60 ML/MIN/1.73 M^2
ESTIMATED AVG GLUCOSE: 108 MG/DL (ref 68–131)
GLUCOSE SERPL-MCNC: 110 MG/DL (ref 70–110)
HBA1C MFR BLD: 5.4 % (ref 4.5–6.2)
HCT VFR BLD AUTO: 40.9 % (ref 37–48.5)
HDLC SERPL-MCNC: 54 MG/DL (ref 40–75)
HDLC SERPL: 20.5 % (ref 20–50)
HGB BLD-MCNC: 13.3 G/DL (ref 12–16)
IMM GRANULOCYTES # BLD AUTO: 0.03 K/UL (ref 0–0.04)
IMM GRANULOCYTES NFR BLD AUTO: 0.4 % (ref 0–0.5)
LDLC SERPL CALC-MCNC: 193.6 MG/DL (ref 63–159)
LYMPHOCYTES # BLD AUTO: 1.9 K/UL (ref 1–4.8)
LYMPHOCYTES NFR BLD: 23.9 % (ref 18–48)
MCH RBC QN AUTO: 29.4 PG (ref 27–31)
MCHC RBC AUTO-ENTMCNC: 32.5 G/DL (ref 32–36)
MCV RBC AUTO: 91 FL (ref 82–98)
MONOCYTES # BLD AUTO: 0.6 K/UL (ref 0.3–1)
MONOCYTES NFR BLD: 7 % (ref 4–15)
NEUTROPHILS # BLD AUTO: 5.1 K/UL (ref 1.8–7.7)
NEUTROPHILS NFR BLD: 65.4 % (ref 38–73)
NONHDLC SERPL-MCNC: 209 MG/DL
NRBC BLD-RTO: 0 /100 WBC
PLATELET # BLD AUTO: 307 K/UL (ref 150–450)
PMV BLD AUTO: 9.8 FL (ref 9.2–12.9)
POTASSIUM SERPL-SCNC: 4.3 MMOL/L (ref 3.5–5.1)
PROT SERPL-MCNC: 7.7 G/DL (ref 6–8.4)
RBC # BLD AUTO: 4.52 M/UL (ref 4–5.4)
SODIUM SERPL-SCNC: 143 MMOL/L (ref 136–145)
T4 FREE SERPL-MCNC: 0.9 NG/DL (ref 0.71–1.51)
TRIGL SERPL-MCNC: 77 MG/DL (ref 30–150)
TSH SERPL DL<=0.005 MIU/L-ACNC: 1.81 UIU/ML (ref 0.34–5.6)
VIT B12 SERPL-MCNC: 324 PG/ML (ref 210–950)
WBC # BLD AUTO: 7.82 K/UL (ref 3.9–12.7)

## 2021-04-14 PROCEDURE — 84443 ASSAY THYROID STIM HORMONE: CPT | Performed by: FAMILY MEDICINE

## 2021-04-14 PROCEDURE — 80061 LIPID PANEL: CPT | Performed by: FAMILY MEDICINE

## 2021-04-14 PROCEDURE — 83036 HEMOGLOBIN GLYCOSYLATED A1C: CPT | Performed by: FAMILY MEDICINE

## 2021-04-14 PROCEDURE — 82607 VITAMIN B-12: CPT | Performed by: FAMILY MEDICINE

## 2021-04-14 PROCEDURE — 99396 PREV VISIT EST AGE 40-64: CPT | Mod: S$GLB,,, | Performed by: FAMILY MEDICINE

## 2021-04-14 PROCEDURE — 82306 VITAMIN D 25 HYDROXY: CPT | Performed by: FAMILY MEDICINE

## 2021-04-14 PROCEDURE — 80053 COMPREHEN METABOLIC PANEL: CPT | Performed by: FAMILY MEDICINE

## 2021-04-14 PROCEDURE — 84439 ASSAY OF FREE THYROXINE: CPT | Performed by: FAMILY MEDICINE

## 2021-04-14 PROCEDURE — 85025 COMPLETE CBC W/AUTO DIFF WBC: CPT | Performed by: FAMILY MEDICINE

## 2021-04-14 PROCEDURE — 99999 PR PBB SHADOW E&M-EST. PATIENT-LVL III: CPT | Mod: PBBFAC,,, | Performed by: FAMILY MEDICINE

## 2021-04-14 PROCEDURE — 86803 HEPATITIS C AB TEST: CPT | Performed by: FAMILY MEDICINE

## 2021-04-14 PROCEDURE — 36415 COLL VENOUS BLD VENIPUNCTURE: CPT | Performed by: FAMILY MEDICINE

## 2021-04-14 PROCEDURE — 99396 PR PREVENTIVE VISIT,EST,40-64: ICD-10-PCS | Mod: S$GLB,,, | Performed by: FAMILY MEDICINE

## 2021-04-14 PROCEDURE — 99999 PR PBB SHADOW E&M-EST. PATIENT-LVL III: ICD-10-PCS | Mod: PBBFAC,,, | Performed by: FAMILY MEDICINE

## 2021-04-14 RX ORDER — ZOSTER VACCINE RECOMBINANT, ADJUVANTED 50 MCG/0.5
0.5 KIT INTRAMUSCULAR ONCE
Qty: 1 EACH | Refills: 0 | Status: SHIPPED | OUTPATIENT
Start: 2021-04-14 | End: 2021-04-14

## 2021-04-14 RX ORDER — ZOSTER VACCINE RECOMBINANT, ADJUVANTED 50 MCG/0.5
0.5 KIT INTRAMUSCULAR ONCE
Qty: 1 EACH | Refills: 0 | Status: SHIPPED | OUTPATIENT
Start: 2021-04-14 | End: 2021-04-14 | Stop reason: SDUPTHER

## 2021-04-15 ENCOUNTER — PATIENT MESSAGE (OUTPATIENT)
Dept: FAMILY MEDICINE | Facility: CLINIC | Age: 46
End: 2021-04-15

## 2021-04-15 LAB — HCV AB SERPL QL IA: NEGATIVE

## 2021-04-15 RX ORDER — ERGOCALCIFEROL 1.25 MG/1
50000 CAPSULE ORAL
Qty: 12 CAPSULE | Refills: 1 | Status: SHIPPED | OUTPATIENT
Start: 2021-04-15

## 2021-07-19 ENCOUNTER — CLINICAL SUPPORT (OUTPATIENT)
Dept: FAMILY MEDICINE | Facility: CLINIC | Age: 46
End: 2021-07-19
Payer: COMMERCIAL

## 2021-07-19 DIAGNOSIS — M77.9 INFLAMMATION AROUND JOINT: Primary | ICD-10-CM

## 2021-07-19 PROBLEM — Z00.00 ANNUAL PHYSICAL EXAM: Status: RESOLVED | Noted: 2021-04-14 | Resolved: 2021-07-19

## 2021-07-19 PROCEDURE — 96372 THER/PROPH/DIAG INJ SC/IM: CPT | Mod: S$GLB,,, | Performed by: FAMILY MEDICINE

## 2021-07-19 PROCEDURE — 96372 PR INJECTION,THERAP/PROPH/DIAG2ST, IM OR SUBCUT: ICD-10-PCS | Mod: S$GLB,,, | Performed by: FAMILY MEDICINE

## 2021-07-19 RX ORDER — DEXAMETHASONE SODIUM PHOSPHATE 4 MG/ML
8 INJECTION, SOLUTION INTRA-ARTICULAR; INTRALESIONAL; INTRAMUSCULAR; INTRAVENOUS; SOFT TISSUE ONCE
Status: COMPLETED | OUTPATIENT
Start: 2021-07-19 | End: 2021-07-19

## 2021-07-19 RX ADMIN — DEXAMETHASONE SODIUM PHOSPHATE 8 MG: 4 INJECTION, SOLUTION INTRA-ARTICULAR; INTRALESIONAL; INTRAMUSCULAR; INTRAVENOUS; SOFT TISSUE at 04:07

## 2021-07-21 ENCOUNTER — HOSPITAL ENCOUNTER (OUTPATIENT)
Dept: RADIOLOGY | Facility: HOSPITAL | Age: 46
Discharge: HOME | End: 2021-07-21
Attending: FAMILY MEDICINE
Payer: COMMERCIAL

## 2021-07-21 ENCOUNTER — TELEPHONE (OUTPATIENT)
Dept: FAMILY MEDICINE | Facility: CLINIC | Age: 46
End: 2021-07-21

## 2021-07-21 DIAGNOSIS — M79.601 PAIN OF RIGHT UPPER EXTREMITY: ICD-10-CM

## 2021-07-21 DIAGNOSIS — M79.601 PAIN OF RIGHT UPPER EXTREMITY: Primary | ICD-10-CM

## 2021-07-21 PROCEDURE — 73090 X-RAY EXAM OF FOREARM: CPT | Mod: TC,PN,RT

## 2021-07-21 PROCEDURE — 73080 XR ELBOW COMPLETE 3 VIEW RIGHT: ICD-10-PCS | Mod: 26,RT,, | Performed by: RADIOLOGY

## 2021-07-21 PROCEDURE — 73090 XR FOREARM RIGHT: ICD-10-PCS | Mod: 26,RT,, | Performed by: RADIOLOGY

## 2021-07-21 PROCEDURE — 73110 X-RAY EXAM OF WRIST: CPT | Mod: TC,PN,RT

## 2021-07-21 PROCEDURE — 73080 X-RAY EXAM OF ELBOW: CPT | Mod: 26,RT,, | Performed by: RADIOLOGY

## 2021-07-21 PROCEDURE — 73030 X-RAY EXAM OF SHOULDER: CPT | Mod: TC,PN,RT

## 2021-07-21 PROCEDURE — 73130 XR HAND COMPLETE 3 VIEW RIGHT: ICD-10-PCS | Mod: 26,RT,, | Performed by: RADIOLOGY

## 2021-07-21 PROCEDURE — 73130 X-RAY EXAM OF HAND: CPT | Mod: 26,RT,, | Performed by: RADIOLOGY

## 2021-07-21 PROCEDURE — 73030 XR SHOULDER COMPLETE 2 OR MORE VIEWS RIGHT: ICD-10-PCS | Mod: 26,RT,, | Performed by: RADIOLOGY

## 2021-07-21 PROCEDURE — 73110 X-RAY EXAM OF WRIST: CPT | Mod: 26,RT,, | Performed by: RADIOLOGY

## 2021-07-21 PROCEDURE — 73080 X-RAY EXAM OF ELBOW: CPT | Mod: TC,PN,RT

## 2021-07-21 PROCEDURE — 73090 X-RAY EXAM OF FOREARM: CPT | Mod: 26,RT,, | Performed by: RADIOLOGY

## 2021-07-21 PROCEDURE — 73110 XR WRIST COMPLETE 3 VIEWS RIGHT: ICD-10-PCS | Mod: 26,RT,, | Performed by: RADIOLOGY

## 2021-07-21 PROCEDURE — 73130 X-RAY EXAM OF HAND: CPT | Mod: TC,PN,RT

## 2021-07-21 PROCEDURE — 73030 X-RAY EXAM OF SHOULDER: CPT | Mod: 26,RT,, | Performed by: RADIOLOGY

## 2021-09-03 RX ORDER — PREDNISONE 20 MG/1
20 TABLET ORAL 2 TIMES DAILY
Qty: 10 TABLET | Refills: 0 | Status: SHIPPED | OUTPATIENT
Start: 2021-09-03 | End: 2021-09-08

## 2021-09-10 ENCOUNTER — TELEPHONE (OUTPATIENT)
Dept: FAMILY MEDICINE | Facility: CLINIC | Age: 46
End: 2021-09-10

## 2021-09-10 DIAGNOSIS — M25.539 PAIN IN WRIST, UNSPECIFIED LATERALITY: Primary | ICD-10-CM

## 2021-09-20 ENCOUNTER — TELEPHONE (OUTPATIENT)
Dept: FAMILY MEDICINE | Facility: CLINIC | Age: 46
End: 2021-09-20

## 2021-09-20 DIAGNOSIS — M25.539 PAIN IN WRIST, UNSPECIFIED LATERALITY: Primary | ICD-10-CM

## 2021-10-13 ENCOUNTER — TELEPHONE (OUTPATIENT)
Dept: FAMILY MEDICINE | Facility: CLINIC | Age: 46
End: 2021-10-13

## 2021-10-13 DIAGNOSIS — R53.83 FATIGUE, UNSPECIFIED TYPE: Primary | ICD-10-CM

## 2021-10-14 ENCOUNTER — LAB VISIT (OUTPATIENT)
Dept: LAB | Facility: HOSPITAL | Age: 46
End: 2021-10-14
Attending: FAMILY MEDICINE
Payer: COMMERCIAL

## 2021-10-14 DIAGNOSIS — R53.83 FATIGUE, UNSPECIFIED TYPE: ICD-10-CM

## 2021-10-14 LAB
25(OH)D3+25(OH)D2 SERPL-MCNC: 30 NG/ML (ref 30–96)
ALBUMIN SERPL BCP-MCNC: 3.9 G/DL (ref 3.5–5.2)
ALP SERPL-CCNC: 81 U/L (ref 55–135)
ALT SERPL W/O P-5'-P-CCNC: 21 U/L (ref 10–44)
ANION GAP SERPL CALC-SCNC: 11 MMOL/L (ref 8–16)
AST SERPL-CCNC: 20 U/L (ref 10–40)
BASOPHILS # BLD AUTO: 0.11 K/UL (ref 0–0.2)
BASOPHILS NFR BLD: 1.3 % (ref 0–1.9)
BILIRUB SERPL-MCNC: 0.6 MG/DL (ref 0.1–1)
BUN SERPL-MCNC: 16 MG/DL (ref 6–20)
CALCIUM SERPL-MCNC: 9.3 MG/DL (ref 8.7–10.5)
CHLORIDE SERPL-SCNC: 109 MMOL/L (ref 95–110)
CO2 SERPL-SCNC: 21 MMOL/L (ref 23–29)
CREAT SERPL-MCNC: 1 MG/DL (ref 0.5–1.4)
DIFFERENTIAL METHOD: NORMAL
EOSINOPHIL # BLD AUTO: 0.3 K/UL (ref 0–0.5)
EOSINOPHIL NFR BLD: 3.2 % (ref 0–8)
ERYTHROCYTE [DISTWIDTH] IN BLOOD BY AUTOMATED COUNT: 13.4 % (ref 11.5–14.5)
ERYTHROCYTE [SEDIMENTATION RATE] IN BLOOD BY WESTERGREN METHOD: 8 MM/HR (ref 0–20)
EST. GFR  (AFRICAN AMERICAN): >60 ML/MIN/1.73 M^2
EST. GFR  (NON AFRICAN AMERICAN): >60 ML/MIN/1.73 M^2
ESTIMATED AVG GLUCOSE: 105 MG/DL (ref 68–131)
GLUCOSE SERPL-MCNC: 90 MG/DL (ref 70–110)
HBA1C MFR BLD: 5.3 % (ref 4–5.6)
HCT VFR BLD AUTO: 40.1 % (ref 37–48.5)
HGB BLD-MCNC: 13.7 G/DL (ref 12–16)
IMM GRANULOCYTES # BLD AUTO: 0.01 K/UL (ref 0–0.04)
IMM GRANULOCYTES NFR BLD AUTO: 0.1 % (ref 0–0.5)
LYMPHOCYTES # BLD AUTO: 2.3 K/UL (ref 1–4.8)
LYMPHOCYTES NFR BLD: 27.8 % (ref 18–48)
MCH RBC QN AUTO: 30.1 PG (ref 27–31)
MCHC RBC AUTO-ENTMCNC: 34.2 G/DL (ref 32–36)
MCV RBC AUTO: 88 FL (ref 82–98)
MONOCYTES # BLD AUTO: 0.5 K/UL (ref 0.3–1)
MONOCYTES NFR BLD: 6.1 % (ref 4–15)
NEUTROPHILS # BLD AUTO: 5 K/UL (ref 1.8–7.7)
NEUTROPHILS NFR BLD: 61.5 % (ref 38–73)
NRBC BLD-RTO: 0 /100 WBC
PLATELET # BLD AUTO: 283 K/UL (ref 150–450)
PMV BLD AUTO: 9.2 FL (ref 9.2–12.9)
POTASSIUM SERPL-SCNC: 4 MMOL/L (ref 3.5–5.1)
PROT SERPL-MCNC: 7.1 G/DL (ref 6–8.4)
RBC # BLD AUTO: 4.55 M/UL (ref 4–5.4)
SODIUM SERPL-SCNC: 141 MMOL/L (ref 136–145)
T4 FREE SERPL-MCNC: 0.86 NG/DL (ref 0.71–1.51)
TSH SERPL DL<=0.005 MIU/L-ACNC: 1.58 UIU/ML (ref 0.4–4)
WBC # BLD AUTO: 8.2 K/UL (ref 3.9–12.7)

## 2021-10-14 PROCEDURE — 36415 COLL VENOUS BLD VENIPUNCTURE: CPT | Performed by: FAMILY MEDICINE

## 2021-10-14 PROCEDURE — 85651 RBC SED RATE NONAUTOMATED: CPT | Performed by: FAMILY MEDICINE

## 2021-10-14 PROCEDURE — 84439 ASSAY OF FREE THYROXINE: CPT | Performed by: FAMILY MEDICINE

## 2021-10-14 PROCEDURE — 85025 COMPLETE CBC W/AUTO DIFF WBC: CPT | Performed by: FAMILY MEDICINE

## 2021-10-14 PROCEDURE — 84443 ASSAY THYROID STIM HORMONE: CPT | Performed by: FAMILY MEDICINE

## 2021-10-14 PROCEDURE — 83036 HEMOGLOBIN GLYCOSYLATED A1C: CPT | Performed by: FAMILY MEDICINE

## 2021-10-14 PROCEDURE — 82306 VITAMIN D 25 HYDROXY: CPT | Performed by: FAMILY MEDICINE

## 2021-10-14 PROCEDURE — 80053 COMPREHEN METABOLIC PANEL: CPT | Performed by: FAMILY MEDICINE

## 2021-10-15 ENCOUNTER — PATIENT MESSAGE (OUTPATIENT)
Dept: FAMILY MEDICINE | Facility: CLINIC | Age: 46
End: 2021-10-15
Payer: COMMERCIAL

## 2022-01-11 ENCOUNTER — PATIENT MESSAGE (OUTPATIENT)
Dept: FAMILY MEDICINE | Facility: CLINIC | Age: 47
End: 2022-01-11

## 2022-01-11 ENCOUNTER — CLINICAL SUPPORT (OUTPATIENT)
Dept: FAMILY MEDICINE | Facility: CLINIC | Age: 47
End: 2022-01-11
Payer: COMMERCIAL

## 2022-01-11 DIAGNOSIS — R68.89 FLU-LIKE SYMPTOMS: Primary | ICD-10-CM

## 2022-01-11 LAB
CTP QC/QA: YES
FLUAV AG NPH QL: NEGATIVE
FLUBV AG NPH QL: NEGATIVE
S PYO RRNA THROAT QL PROBE: NEGATIVE
SARS-COV-2 RDRP RESP QL NAA+PROBE: POSITIVE

## 2022-01-11 PROCEDURE — 87880 POCT RAPID STREP A: ICD-10-PCS | Mod: QW,S$GLB,, | Performed by: FAMILY MEDICINE

## 2022-01-11 PROCEDURE — 87804 INFLUENZA ASSAY W/OPTIC: CPT | Mod: QW,S$GLB,, | Performed by: FAMILY MEDICINE

## 2022-01-11 PROCEDURE — U0002: ICD-10-PCS | Mod: QW,S$GLB,, | Performed by: FAMILY MEDICINE

## 2022-01-11 PROCEDURE — 87804 POCT INFLUENZA A/B: ICD-10-PCS | Mod: 59,QW,S$GLB, | Performed by: FAMILY MEDICINE

## 2022-01-11 PROCEDURE — 99999 PR PBB SHADOW E&M-EST. PATIENT-LVL I: CPT | Mod: PBBFAC,,,

## 2022-01-11 PROCEDURE — 87880 STREP A ASSAY W/OPTIC: CPT | Mod: QW,S$GLB,, | Performed by: FAMILY MEDICINE

## 2022-01-11 PROCEDURE — U0002 COVID-19 LAB TEST NON-CDC: HCPCS | Mod: QW,S$GLB,, | Performed by: FAMILY MEDICINE

## 2022-01-11 PROCEDURE — 99999 PR PBB SHADOW E&M-EST. PATIENT-LVL I: ICD-10-PCS | Mod: PBBFAC,,,

## 2022-01-11 RX ORDER — AZITHROMYCIN 250 MG/1
TABLET, FILM COATED ORAL
Qty: 6 TABLET | Refills: 0 | Status: SHIPPED | OUTPATIENT
Start: 2022-01-11 | End: 2022-06-13

## 2022-01-11 RX ORDER — PREDNISONE 20 MG/1
20 TABLET ORAL 2 TIMES DAILY
Qty: 10 TABLET | Refills: 0 | Status: SHIPPED | OUTPATIENT
Start: 2022-01-11 | End: 2022-01-16

## 2022-01-11 RX ORDER — PROMETHAZINE HYDROCHLORIDE AND DEXTROMETHORPHAN HYDROBROMIDE 6.25; 15 MG/5ML; MG/5ML
5 SYRUP ORAL EVERY 6 HOURS PRN
Qty: 240 ML | Refills: 1 | Status: SHIPPED | OUTPATIENT
Start: 2022-01-11 | End: 2022-06-13

## 2022-01-11 NOTE — PROGRESS NOTES
Presents to clinic for POCT rapid covid, influenza and strep testing. States name and date of birth. Nasal swab obtained. Informed patient results will take approximately 15 minutes for each test. Verbalized understanding.   Influenza A results negative.  Influenza B results negative.  Strep A results negative.  Covid results positive.       Patient informed via patient portal as requested.   Results routed to PCP for review.

## 2022-01-11 NOTE — Clinical Note
Patient is covid positive. Complaining of chest and nasal congestion and fever since yesterday, would like any medications recommended.

## 2022-01-27 ENCOUNTER — TELEPHONE (OUTPATIENT)
Dept: FAMILY MEDICINE | Facility: CLINIC | Age: 47
End: 2022-01-27
Payer: COMMERCIAL

## 2022-01-27 RX ORDER — AMOXICILLIN 875 MG/1
875 TABLET, FILM COATED ORAL EVERY 12 HOURS
Qty: 20 TABLET | Refills: 0 | Status: SHIPPED | OUTPATIENT
Start: 2022-01-27 | End: 2022-06-13

## 2022-02-15 ENCOUNTER — TELEPHONE (OUTPATIENT)
Dept: FAMILY MEDICINE | Facility: CLINIC | Age: 47
End: 2022-02-15
Payer: COMMERCIAL

## 2022-02-15 NOTE — LETTER
February 15, 2022    Kristy Hernandez  [address]  [place] [address]             [place] [id number] [facility] [address]  [facility] [place] [address]  Phone: [phone]  Fax: [phone] To whom it may concern:    Ms. Hernandez is taking the following medicines:    Trintellix 20mg daily    wellbutrin 150mg daily    Valacyclovir 1000mg bid prn    If you have any questions or concerns, please don't hesitate to call.    Sincerely,        David Lopez MD

## 2022-02-15 NOTE — TELEPHONE ENCOUNTER
Pt is requesting letter for her new life insurance policy stating the only meds she is currently taking. Pt is taking Trintellix 20 mg daily, Bupropion 150 mg, & Valacyclovir 1,000 mg.

## 2022-02-15 NOTE — TELEPHONE ENCOUNTER
I have written the letter, and I will post it to her portal. If she needs a signed copy, please let me know.

## 2022-03-21 ENCOUNTER — PATIENT MESSAGE (OUTPATIENT)
Dept: ADMINISTRATIVE | Facility: HOSPITAL | Age: 47
End: 2022-03-21
Payer: COMMERCIAL

## 2022-04-04 ENCOUNTER — PATIENT MESSAGE (OUTPATIENT)
Dept: ADMINISTRATIVE | Facility: HOSPITAL | Age: 47
End: 2022-04-04
Payer: COMMERCIAL

## 2022-05-31 ENCOUNTER — PATIENT MESSAGE (OUTPATIENT)
Dept: ADMINISTRATIVE | Facility: HOSPITAL | Age: 47
End: 2022-05-31
Payer: COMMERCIAL

## 2022-06-13 ENCOUNTER — OFFICE VISIT (OUTPATIENT)
Dept: FAMILY MEDICINE | Facility: CLINIC | Age: 47
End: 2022-06-13
Payer: COMMERCIAL

## 2022-06-13 ENCOUNTER — LAB VISIT (OUTPATIENT)
Dept: LAB | Facility: HOSPITAL | Age: 47
End: 2022-06-13
Attending: FAMILY MEDICINE
Payer: COMMERCIAL

## 2022-06-13 VITALS
DIASTOLIC BLOOD PRESSURE: 88 MMHG | HEIGHT: 62 IN | HEART RATE: 83 BPM | BODY MASS INDEX: 36.09 KG/M2 | RESPIRATION RATE: 17 BRPM | SYSTOLIC BLOOD PRESSURE: 137 MMHG | WEIGHT: 196.13 LBS | OXYGEN SATURATION: 98 %

## 2022-06-13 DIAGNOSIS — Z00.00 ANNUAL PHYSICAL EXAM: ICD-10-CM

## 2022-06-13 DIAGNOSIS — F32.89 OTHER DEPRESSION: ICD-10-CM

## 2022-06-13 DIAGNOSIS — Z00.00 ANNUAL PHYSICAL EXAM: Primary | ICD-10-CM

## 2022-06-13 LAB
ALBUMIN SERPL BCP-MCNC: 4 G/DL (ref 3.5–5.2)
ALP SERPL-CCNC: 79 U/L (ref 55–135)
ALT SERPL W/O P-5'-P-CCNC: 17 U/L (ref 10–44)
ANION GAP SERPL CALC-SCNC: 8 MMOL/L (ref 8–16)
AST SERPL-CCNC: 16 U/L (ref 10–40)
BASOPHILS # BLD AUTO: 0.07 K/UL (ref 0–0.2)
BASOPHILS NFR BLD: 0.8 % (ref 0–1.9)
BILIRUB SERPL-MCNC: 0.8 MG/DL (ref 0.1–1)
BUN SERPL-MCNC: 11 MG/DL (ref 6–20)
CALCIUM SERPL-MCNC: 9.2 MG/DL (ref 8.7–10.5)
CHLORIDE SERPL-SCNC: 108 MMOL/L (ref 95–110)
CHOLEST SERPL-MCNC: 220 MG/DL (ref 120–199)
CHOLEST/HDLC SERPL: 4.9 {RATIO} (ref 2–5)
CO2 SERPL-SCNC: 22 MMOL/L (ref 23–29)
CREAT SERPL-MCNC: 0.9 MG/DL (ref 0.5–1.4)
DIFFERENTIAL METHOD: ABNORMAL
EOSINOPHIL # BLD AUTO: 0.3 K/UL (ref 0–0.5)
EOSINOPHIL NFR BLD: 3.3 % (ref 0–8)
ERYTHROCYTE [DISTWIDTH] IN BLOOD BY AUTOMATED COUNT: 13.2 % (ref 11.5–14.5)
EST. GFR  (AFRICAN AMERICAN): >60 ML/MIN/1.73 M^2
EST. GFR  (NON AFRICAN AMERICAN): >60 ML/MIN/1.73 M^2
ESTIMATED AVG GLUCOSE: 105 MG/DL (ref 68–131)
GLUCOSE SERPL-MCNC: 85 MG/DL (ref 70–110)
HBA1C MFR BLD: 5.3 % (ref 4–5.6)
HCT VFR BLD AUTO: 40.1 % (ref 37–48.5)
HDLC SERPL-MCNC: 45 MG/DL (ref 40–75)
HDLC SERPL: 20.5 % (ref 20–50)
HGB BLD-MCNC: 13.2 G/DL (ref 12–16)
IMM GRANULOCYTES # BLD AUTO: 0.04 K/UL (ref 0–0.04)
IMM GRANULOCYTES NFR BLD AUTO: 0.5 % (ref 0–0.5)
LDLC SERPL CALC-MCNC: 137.4 MG/DL (ref 63–159)
LYMPHOCYTES # BLD AUTO: 2.7 K/UL (ref 1–4.8)
LYMPHOCYTES NFR BLD: 30.4 % (ref 18–48)
MCH RBC QN AUTO: 29.6 PG (ref 27–31)
MCHC RBC AUTO-ENTMCNC: 32.9 G/DL (ref 32–36)
MCV RBC AUTO: 90 FL (ref 82–98)
MONOCYTES # BLD AUTO: 0.6 K/UL (ref 0.3–1)
MONOCYTES NFR BLD: 7.2 % (ref 4–15)
NEUTROPHILS # BLD AUTO: 5.1 K/UL (ref 1.8–7.7)
NEUTROPHILS NFR BLD: 57.8 % (ref 38–73)
NONHDLC SERPL-MCNC: 175 MG/DL
NRBC BLD-RTO: 0 /100 WBC
PLATELET # BLD AUTO: 268 K/UL (ref 150–450)
PMV BLD AUTO: 9.1 FL (ref 9.2–12.9)
POTASSIUM SERPL-SCNC: 4.3 MMOL/L (ref 3.5–5.1)
PROT SERPL-MCNC: 7.4 G/DL (ref 6–8.4)
RBC # BLD AUTO: 4.46 M/UL (ref 4–5.4)
SODIUM SERPL-SCNC: 138 MMOL/L (ref 136–145)
T4 FREE SERPL-MCNC: 0.85 NG/DL (ref 0.71–1.51)
TRIGL SERPL-MCNC: 188 MG/DL (ref 30–150)
TSH SERPL DL<=0.005 MIU/L-ACNC: 1.23 UIU/ML (ref 0.4–4)
WBC # BLD AUTO: 8.78 K/UL (ref 3.9–12.7)

## 2022-06-13 PROCEDURE — 1159F PR MEDICATION LIST DOCUMENTED IN MEDICAL RECORD: ICD-10-PCS | Mod: CPTII,S$GLB,, | Performed by: FAMILY MEDICINE

## 2022-06-13 PROCEDURE — 1160F RVW MEDS BY RX/DR IN RCRD: CPT | Mod: CPTII,S$GLB,, | Performed by: FAMILY MEDICINE

## 2022-06-13 PROCEDURE — 1160F PR REVIEW ALL MEDS BY PRESCRIBER/CLIN PHARMACIST DOCUMENTED: ICD-10-PCS | Mod: CPTII,S$GLB,, | Performed by: FAMILY MEDICINE

## 2022-06-13 PROCEDURE — 85025 COMPLETE CBC W/AUTO DIFF WBC: CPT | Performed by: FAMILY MEDICINE

## 2022-06-13 PROCEDURE — 80061 LIPID PANEL: CPT | Performed by: FAMILY MEDICINE

## 2022-06-13 PROCEDURE — 83036 HEMOGLOBIN GLYCOSYLATED A1C: CPT | Performed by: FAMILY MEDICINE

## 2022-06-13 PROCEDURE — 84439 ASSAY OF FREE THYROXINE: CPT | Performed by: FAMILY MEDICINE

## 2022-06-13 PROCEDURE — 3008F BODY MASS INDEX DOCD: CPT | Mod: CPTII,S$GLB,, | Performed by: FAMILY MEDICINE

## 2022-06-13 PROCEDURE — 3075F SYST BP GE 130 - 139MM HG: CPT | Mod: CPTII,S$GLB,, | Performed by: FAMILY MEDICINE

## 2022-06-13 PROCEDURE — 99999 PR PBB SHADOW E&M-EST. PATIENT-LVL III: CPT | Mod: PBBFAC,,, | Performed by: FAMILY MEDICINE

## 2022-06-13 PROCEDURE — 3079F PR MOST RECENT DIASTOLIC BLOOD PRESSURE 80-89 MM HG: ICD-10-PCS | Mod: CPTII,S$GLB,, | Performed by: FAMILY MEDICINE

## 2022-06-13 PROCEDURE — 99999 PR PBB SHADOW E&M-EST. PATIENT-LVL III: ICD-10-PCS | Mod: PBBFAC,,, | Performed by: FAMILY MEDICINE

## 2022-06-13 PROCEDURE — 3079F DIAST BP 80-89 MM HG: CPT | Mod: CPTII,S$GLB,, | Performed by: FAMILY MEDICINE

## 2022-06-13 PROCEDURE — 84443 ASSAY THYROID STIM HORMONE: CPT | Performed by: FAMILY MEDICINE

## 2022-06-13 PROCEDURE — 1159F MED LIST DOCD IN RCRD: CPT | Mod: CPTII,S$GLB,, | Performed by: FAMILY MEDICINE

## 2022-06-13 PROCEDURE — 99396 PREV VISIT EST AGE 40-64: CPT | Mod: S$GLB,,, | Performed by: FAMILY MEDICINE

## 2022-06-13 PROCEDURE — 99396 PR PREVENTIVE VISIT,EST,40-64: ICD-10-PCS | Mod: S$GLB,,, | Performed by: FAMILY MEDICINE

## 2022-06-13 PROCEDURE — 3075F PR MOST RECENT SYSTOLIC BLOOD PRESS GE 130-139MM HG: ICD-10-PCS | Mod: CPTII,S$GLB,, | Performed by: FAMILY MEDICINE

## 2022-06-13 PROCEDURE — 36415 COLL VENOUS BLD VENIPUNCTURE: CPT | Performed by: FAMILY MEDICINE

## 2022-06-13 PROCEDURE — 80053 COMPREHEN METABOLIC PANEL: CPT | Performed by: FAMILY MEDICINE

## 2022-06-13 PROCEDURE — 3008F PR BODY MASS INDEX (BMI) DOCUMENTED: ICD-10-PCS | Mod: CPTII,S$GLB,, | Performed by: FAMILY MEDICINE

## 2022-06-13 RX ORDER — VALACYCLOVIR HYDROCHLORIDE 1 G/1
1000 TABLET, FILM COATED ORAL 3 TIMES DAILY
Qty: 90 TABLET | Refills: 11 | Status: SHIPPED | OUTPATIENT
Start: 2022-06-13

## 2022-06-13 RX ORDER — GABAPENTIN 300 MG/1
300 CAPSULE ORAL 3 TIMES DAILY
Qty: 90 CAPSULE | Refills: 3 | Status: SHIPPED | OUTPATIENT
Start: 2022-06-13 | End: 2022-06-20

## 2022-06-13 RX ORDER — BUPROPION HYDROCHLORIDE 150 MG/1
150 TABLET ORAL DAILY
Qty: 90 TABLET | Refills: 3 | Status: SHIPPED | OUTPATIENT
Start: 2022-06-13

## 2022-06-13 RX ORDER — CLONAZEPAM 0.5 MG/1
0.5 TABLET ORAL 2 TIMES DAILY PRN
Qty: 30 TABLET | Refills: 0 | Status: SHIPPED | OUTPATIENT
Start: 2022-06-13 | End: 2023-06-13

## 2022-06-13 NOTE — PROGRESS NOTES
" Patient ID: Kristy Hernandez is a 46 y.o. female.    Chief Complaint: Follow-up and Medication Refill      Reviewed family, medical, surgical, and social history.    No cp/sob  No change in mental status  No fever  No asymmetrical limb swelling    Objective:      /88 (BP Location: Right arm, Patient Position: Sitting, BP Method: Large (Automatic))   Pulse 83   Resp 17   Ht 5' 2" (1.575 m)   Wt 89 kg (196 lb 1.6 oz)   SpO2 98%   BMI 35.87 kg/m²   RRR, CTAB, s/nt/nd, no c/c/e, non-toxic appearing, no focal weakness  Assessment:       1. Annual physical exam    2. Other depression        Plan:       Annual physical exam  -     CBC Auto Differential; Future; Expected date: 06/13/2022  -     Comprehensive Metabolic Panel; Future; Expected date: 06/13/2022  -     Hemoglobin A1C; Future; Expected date: 06/13/2022  -     Lipid Panel; Future; Expected date: 06/13/2022  -     TSH; Future; Expected date: 06/13/2022  -     T4, Free; Future; Expected date: 06/13/2022    Other depression  -     buPROPion (WELLBUTRIN XL) 150 MG TB24 tablet; Take 1 tablet (150 mg total) by mouth once daily.  Dispense: 90 tablet; Refill: 3    Other orders  -     valACYclovir (VALTREX) 1000 MG tablet; Take 1 tablet (1,000 mg total) by mouth 3 (three) times daily.  Dispense: 90 tablet; Refill: 11  -     gabapentin (NEURONTIN) 300 MG capsule; Take 1 capsule (300 mg total) by mouth 3 (three) times daily. for 7 days  Dispense: 90 capsule; Refill: 3  -     clonazePAM (KLONOPIN) 0.5 MG tablet; Take 1 tablet (0.5 mg total) by mouth 2 (two) times daily as needed for Anxiety.  Dispense: 30 tablet; Refill: 0            Reviewed, monitored, evaluated, and assessed chronic conditions as outlined above  Continue current medicines, any changes noted above  As shown   reviewed  Labs, radiology studies, and procedures/notes from the last 3 months were reviewed.    Risks, benefits, and side effects were discussed with the patient. All questions " were answered to the fullest satisfaction of the patient, and pt verbalized understanding and agreement to treatment plan. Pt was to call with any new or worsening symptoms, or present to the ER.

## 2022-06-15 ENCOUNTER — HOSPITAL ENCOUNTER (OUTPATIENT)
Dept: RADIOLOGY | Facility: HOSPITAL | Age: 47
Discharge: HOME | End: 2022-06-15
Attending: FAMILY MEDICINE
Payer: COMMERCIAL

## 2022-06-15 VITALS — HEIGHT: 62 IN | BODY MASS INDEX: 36.1 KG/M2 | WEIGHT: 196.19 LBS

## 2022-06-15 DIAGNOSIS — Z12.31 ENCOUNTER FOR SCREENING MAMMOGRAM FOR MALIGNANT NEOPLASM OF BREAST: ICD-10-CM

## 2022-06-15 PROCEDURE — 77063 BREAST TOMOSYNTHESIS BI: CPT | Mod: TC

## 2022-06-15 PROCEDURE — 77067 MAMMO DIGITAL SCREENING BILAT WITH TOMO: ICD-10-PCS | Mod: 26,,, | Performed by: RADIOLOGY

## 2022-06-15 PROCEDURE — 77063 BREAST TOMOSYNTHESIS BI: CPT | Mod: 26,,, | Performed by: RADIOLOGY

## 2022-06-15 PROCEDURE — 77063 MAMMO DIGITAL SCREENING BILAT WITH TOMO: ICD-10-PCS | Mod: 26,,, | Performed by: RADIOLOGY

## 2022-06-15 PROCEDURE — 77067 SCR MAMMO BI INCL CAD: CPT | Mod: 26,,, | Performed by: RADIOLOGY

## 2022-06-15 PROCEDURE — 77067 SCR MAMMO BI INCL CAD: CPT | Mod: TC

## 2022-06-16 ENCOUNTER — TELEPHONE (OUTPATIENT)
Dept: FAMILY MEDICINE | Facility: CLINIC | Age: 47
End: 2022-06-16
Payer: COMMERCIAL

## 2022-06-16 RX ORDER — CEFDINIR 300 MG/1
300 CAPSULE ORAL 2 TIMES DAILY
Qty: 20 CAPSULE | Refills: 0 | Status: SHIPPED | OUTPATIENT
Start: 2022-06-16 | End: 2022-06-26

## 2022-07-27 DIAGNOSIS — Z12.11 COLON CANCER SCREENING: ICD-10-CM

## 2022-08-01 ENCOUNTER — PATIENT MESSAGE (OUTPATIENT)
Dept: ADMINISTRATIVE | Facility: HOSPITAL | Age: 47
End: 2022-08-01
Payer: COMMERCIAL

## 2022-08-22 ENCOUNTER — PATIENT MESSAGE (OUTPATIENT)
Dept: FAMILY MEDICINE | Facility: CLINIC | Age: 47
End: 2022-08-22
Payer: COMMERCIAL

## 2022-12-05 ENCOUNTER — PATIENT MESSAGE (OUTPATIENT)
Dept: FAMILY MEDICINE | Facility: CLINIC | Age: 47
End: 2022-12-05
Payer: COMMERCIAL

## 2023-02-20 ENCOUNTER — PATIENT MESSAGE (OUTPATIENT)
Dept: ADMINISTRATIVE | Facility: HOSPITAL | Age: 48
End: 2023-02-20
Payer: COMMERCIAL

## 2023-02-20 ENCOUNTER — PATIENT OUTREACH (OUTPATIENT)
Dept: ADMINISTRATIVE | Facility: HOSPITAL | Age: 48
End: 2023-02-20
Payer: COMMERCIAL

## 2023-02-20 NOTE — LETTER
March 1, 2023    Kristy Hernandez  [address]  [place] [address]             [facility]  [address]  [place] [address]  Phone: [phone] Dear Mrs. Hernandez,     Here at [facility], we want to help you maintain your overall health. Our records indicate that you recently received a mail-in lab test kit from us for a Colon Cancer Screening Test. This simple screening test, or Fit Kit - done in your own home - can help find colon cancer at an early stage when it can be treated, even before any signs or symptoms develop.      If you have not completed this test and still have the kit, please complete this before the expiration date on the kit. It is important to mail the kit back to [facility] using the prepaid envelope within 24 hours of collecting the specimen.      If you no longer have the kit, please let me know by replying to this email and another one can be sent to you.      If you would like to schedule a colonoscopy instead, please reply to this e-mail and an order can be placed.     If you have any other questions or concerns, please do not hesitate to contact me by replying to this e-mail or by contacting your PCP. As always, thank you for choosing [facility] for your healthcare needs.     Sincerely,         MD Tiana Spicer, Care Coordinator  [facility]  Phone: [phone]  FAX: [phone]

## 2023-02-20 NOTE — PROGRESS NOTES
Non-compliant report chart audits. Chart review completed for HM test overdue (Mammogram, Colon Cancer Screening, Pap smear, DM labs, BP Check and/or Eye Exam)      Care Everywhere and media, updates requested and reviewed.        Attempted to contact pt about scheduling c-scope, LMOR. Portal message sent.

## 2023-03-22 ENCOUNTER — PATIENT MESSAGE (OUTPATIENT)
Dept: ADMINISTRATIVE | Facility: HOSPITAL | Age: 48
End: 2023-03-22
Payer: COMMERCIAL

## 2023-04-11 ENCOUNTER — PATIENT MESSAGE (OUTPATIENT)
Dept: ADMINISTRATIVE | Facility: HOSPITAL | Age: 48
End: 2023-04-11
Payer: COMMERCIAL

## 2023-06-21 DIAGNOSIS — Z12.31 OTHER SCREENING MAMMOGRAM: ICD-10-CM

## 2023-06-27 ENCOUNTER — PATIENT MESSAGE (OUTPATIENT)
Dept: ADMINISTRATIVE | Facility: HOSPITAL | Age: 48
End: 2023-06-27
Payer: COMMERCIAL

## 2023-07-05 NOTE — PROGRESS NOTES
"Subjective:       Patient ID: Kristy Hernandez is a 44 y.o. female.    Chief Complaint: Follow-up (I&D 6/11/20)      HPI:  Ms. Hernandez presents today with no complaints.  She recently had an uncomplicated excision of a chronic left buttock abscess.  She is not experiencing any pain, nausea, vomiting, diarrhea, constipation, fevers, chills, wound concerns, etc.  No wound redness, swelling, drainage, odor, etc.  Activity tolerance has returned to normal.  Appetite is excellent.  Overall she feels "great".      Allergies & Meds:  Review of patient's allergies indicates:  No Known Allergies    Current Outpatient Medications   Medication Sig Dispense Refill    clonazePAM (KLONOPIN) 0.5 MG tablet Take 1 tablet (0.5 mg total) by mouth 2 (two) times daily as needed for Anxiety. 30 tablet 0    methocarbamol (ROBAXIN) 500 MG Tab Take 2 tablets (1,000 mg total) by mouth 3 (three) times daily. (Patient taking differently: Take 1,000 mg by mouth 3 (three) times daily as needed. ) 100 tablet 0    ondansetron (ZOFRAN) 4 MG tablet Take 1 tablet (4 mg total) by mouth every 6 (six) hours as needed for Nausea. 30 tablet 0    valACYclovir (VALTREX) 1000 MG tablet Take 1 tablet (1,000 mg total) by mouth 3 (three) times daily. 90 tablet 3    vortioxetine (TRINTELLIX) 20 mg Tab Take 1 tablet (20 mg total) by mouth once daily. 90 tablet 3    ciprofloxacin HCl (CIPRO) 500 MG tablet Take 1 tablet (500 mg total) by mouth every 12 (twelve) hours. Take as directed until bottle is empty (Patient not taking: Reported on 6/24/2020) 10 tablet 0    gabapentin (NEURONTIN) 300 MG capsule Take 1 capsule (300 mg total) by mouth 3 (three) times daily. for 7 days 21 capsule 0    oxyCODONE-acetaminophen (PERCOCET)  mg per tablet Take 1 tablet by mouth every 8 (eight) hours as needed for Pain. (Patient not taking: Reported on 6/24/2020) 20 tablet 0     Current Facility-Administered Medications   Medication Dose Route Frequency Provider Last " I returned a call back to the pt and stated she was taking betamethasone ointment from a Dermatologist in [place] and she wanted to know if you can refill it for her rash in her face . I asked her if she had contacted the provider in [place] for a refill and she said no she's not going there right now. I then stated okay well considering this medication is not on your med MAR the provider will more than likely want to evaluate your face rash and it is possible that she may refer you to a Dermatologist here. She then replied I'm not going to another Dermatologist here. Please advise. Fyi//kah   Rate Last Dose    ketorolac injection 60 mg  60 mg Intramuscular 1 time in Clinic/HOD David Lopez MD           PMFSHx:    Past Medical History:   Diagnosis Date    Anxiety     Chronic back pain     Depression     Herpes     Obesity        Past Surgical History:   Procedure Laterality Date    BREAST SURGERY      reduction     SECTION      deque      dequervein R wrist    ENDOMETRIAL ABLATION      HYSTERECTOMY      INCISION AND DRAINAGE OF ABSCESS Left 2020    Procedure: INCISION AND DRAINAGE, ABSCESS;  Surgeon: Joshua Guillermo MD;  Location: [facility] OR;  Service: General;  Laterality: Left;    medial branch nerve block       RADIOFREQUENCY ABLATION OF LUMBAR MEDIAL BRANCH NERVE AT SINGLE LEVEL      REDUCTION OF BOTH BREASTS Bilateral 3/13/2019    Procedure: MAMMOPLASTY, REDUCTION, BILATERAL;  Surgeon: Ghanshyam Forte MD;  Location: [facility] OR;  Service: Plastics;  Laterality: Bilateral;    TONSILLECTOMY      TUBAL LIGATION         Family History   Problem Relation Age of Onset    Pancreatic cancer Father     Breast cancer Paternal Aunt     Breast cancer Paternal Grandmother     Breast cancer Paternal Cousin        Social History     Tobacco Use    Smoking status: Former Smoker     Quit date:      Years since quittin.4    Smokeless tobacco: Never Used   Substance Use Topics    Alcohol use: Yes     Comment: socially    Drug use: Never         Review of Systems   Constitutional: Negative for appetite change, chills, fatigue, fever and unexpected weight change.   HENT: Negative for congestion, dental problem, ear pain, mouth sores, postnasal drip, rhinorrhea, sore throat, tinnitus, trouble swallowing and voice change.    Eyes: Negative for photophobia, pain, discharge and visual disturbance.   Respiratory: Negative for cough, chest tightness, shortness of breath and wheezing.    Cardiovascular: Negative for chest pain, palpitations and leg swelling.   Gastrointestinal:  Negative for abdominal pain, blood in stool, constipation, diarrhea, nausea and vomiting.   Endocrine: Negative for cold intolerance, heat intolerance, polydipsia, polyphagia and polyuria.   Genitourinary: Negative for difficulty urinating, dysuria, flank pain, frequency, hematuria and urgency.   Musculoskeletal: Negative for arthralgias, joint swelling and myalgias.   Skin: Negative for color change and rash.   Allergic/Immunologic: Negative for immunocompromised state.   Neurological: Negative for dizziness, tremors, seizures, syncope, speech difficulty, weakness, numbness and headaches.   Hematological: Negative for adenopathy. Does not bruise/bleed easily.   Psychiatric/Behavioral: Negative for agitation, confusion, hallucinations, self-injury and suicidal ideas. The patient is not nervous/anxious.        Objective:      Physical Exam  Constitutional:       General: She is not in acute distress (no acute distress).     Appearance: Normal appearance.   Cardiovascular:      Rate and Rhythm: Normal rate and regular rhythm.      Heart sounds: Normal heart sounds.   Pulmonary:      Effort: Pulmonary effort is normal. Respiratory distress: no respiratory distress.      Breath sounds: Normal breath sounds.   Abdominal:      General: Bowel sounds are normal. Distention: nondistended.      Palpations: Abdomen is soft.      Tenderness: Tenderness: nontender.   Skin:     General: Skin is warm and dry.      Comments: Wound healed well without erythema, edema, exudate, induration, fluctuance, crepitus, necrosis, discoloration, separation   Neurological:      Mental Status: She is alert.           Medical Records Review:  Pathology report reviewed from 6/11/2020.  Excised specimen revealed severe acute and chronic inflammation with microabscess formation and reactive epidermal hyperplasia.  Aerobic culture showed no growth.  Anaerobic culture grew 3 separate anaerobes.      Assessment:       Satisfactory postoperative  recovery.  No evident complications.  Wound healed.  Chronic anaerobic infection increases risks for recurrence.  Risks of prolonged anaerobic antibiotic therapy outweigh benefits.    1. Encounter for postoperative care          Plan:     Encounter for postoperative care          Follow up in about 1 month (around 7/24/2020) for continued postoperative care.  RTC sooner if needed.  Resume care with PCP.

## 2023-10-03 ENCOUNTER — PATIENT MESSAGE (OUTPATIENT)
Dept: ADMINISTRATIVE | Facility: HOSPITAL | Age: 48
End: 2023-10-03
Payer: COMMERCIAL

## 2023-10-17 ENCOUNTER — PATIENT MESSAGE (OUTPATIENT)
Dept: ADMINISTRATIVE | Facility: HOSPITAL | Age: 48
End: 2023-10-17
Payer: COMMERCIAL

## (undated) DEVICE — SUT 2-0 VICRYL / SH (J417)

## (undated) DEVICE — PACK NASAL SINUS

## (undated) DEVICE — SPONGE DERMACEA GAUZE 4X4

## (undated) DEVICE — GLOVE SURG ULTRA TOUCH 7.5

## (undated) DEVICE — SUT MCRYL PLUS 4-0 PS2 27IN

## (undated) DEVICE — PENCIL ELECTROSURG HOLST W/BLD

## (undated) DEVICE — SOL 9P NACL IRR PIC IL

## (undated) DEVICE — CANISTER SUCTION 3000CC

## (undated) DEVICE — ELECTRODE PENCIL W/ROCKER NDL

## (undated) DEVICE — NDL SAFETY 22G X 1.5 ECLIPSE

## (undated) DEVICE — UNDERGLOVE BIOGEL PI SZ 6.5 LF

## (undated) DEVICE — SEE MEDLINE ITEM 154981

## (undated) DEVICE — SEE MEDLINE ITEM 152622

## (undated) DEVICE — ELECTRODE REM PLYHSV RETURN 9

## (undated) DEVICE — COVER LIGHT HANDLE 80/CA

## (undated) DEVICE — KIT COLLECTION E SWAB REGULAR

## (undated) DEVICE — GAUZE SPONGE 4X4 12PLY

## (undated) DEVICE — PACK UNIV PROCEDURE

## (undated) DEVICE — SUT MONOCRYL 3-0 PS-2 UND

## (undated) DEVICE — INTERPULSE SET

## (undated) DEVICE — SYS CLSR DERMABOND PRINEO 22CM

## (undated) DEVICE — NDL SAFETY 25G X 1.5 ECLIPSE

## (undated) DEVICE — SEE MEDLINE ITEM 156964

## (undated) DEVICE — BLADE EZ CLEAN 2.5IN MODIFIED

## (undated) DEVICE — SPONGE LAP 18X18 PREWASHED

## (undated) DEVICE — SUT ETHILON 4-0 PS4 18 BLK

## (undated) DEVICE — NDL 18GA X1 1/2 REG BEVEL

## (undated) DEVICE — BLADE #15 STERILE CARBON

## (undated) DEVICE — BLADE SURG STAINLESS STEEL #10

## (undated) DEVICE — NDL HYPO REG 25G X 1 1/2

## (undated) DEVICE — MARKER SKIN STND TIP BLUE BARR

## (undated) DEVICE — GOWN B1 X-LG X-LONG

## (undated) DEVICE — GLOVE GAMMEX 7 PF STERILE

## (undated) DEVICE — PAD ABD 8X10 STERILE

## (undated) DEVICE — DRAPE STERI LONG

## (undated) DEVICE — SLEEVE SCD EXPRESS CALF MEDIUM

## (undated) DEVICE — BRA CLASSIC COMFORT 42BLACK

## (undated) DEVICE — STAPLER SKIN ROTATING HEAD

## (undated) DEVICE — SYS PRINEO SKIN CLOSURE

## (undated) DEVICE — BLADE SURG CARBON STEEL SZ11

## (undated) DEVICE — TRAY FOLEY 16FR INFECTION CONT

## (undated) DEVICE — SKIN MARKER DEVON 160

## (undated) DEVICE — DECANTER VIAL ASEPTIC TRANSFER

## (undated) DEVICE — GLOVE SURGEONS ULTRA TOUCH 6.5

## (undated) DEVICE — SEE MEDLINE ITEM 157148

## (undated) DEVICE — CHLORAPREP 10.5 ML APPLICATOR

## (undated) DEVICE — SUT VICRYL 2-0 36 CT-1

## (undated) DEVICE — SYR B-D DISP CONTROL 10CC100/C

## (undated) DEVICE — SEE MEDLINE ITEM 157131

## (undated) DEVICE — SPONGE NOVAPLUS LAP 18X18IN